# Patient Record
Sex: MALE | Race: WHITE | Employment: STUDENT | URBAN - METROPOLITAN AREA
[De-identification: names, ages, dates, MRNs, and addresses within clinical notes are randomized per-mention and may not be internally consistent; named-entity substitution may affect disease eponyms.]

---
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
## 2019-01-03 ENCOUNTER — OFFICE VISIT (OUTPATIENT)
Dept: PSYCHOLOGY | Facility: CLINIC | Age: 19
End: 2019-01-03
Payer: COMMERCIAL

## 2019-01-03 DIAGNOSIS — F90.2 ATTENTION DEFICIT HYPERACTIVITY DISORDER, COMBINED TYPE: ICD-10-CM

## 2019-01-03 DIAGNOSIS — Z63.4 BEREAVEMENT: ICD-10-CM

## 2019-01-03 DIAGNOSIS — F32.9 MAJOR DEPRESSIVE DISORDER: ICD-10-CM

## 2019-01-03 DIAGNOSIS — F41.1 GENERALIZED ANXIETY DISORDER: Primary | ICD-10-CM

## 2019-01-03 PROCEDURE — 90834 PSYTX W PT 45 MINUTES: CPT | Performed by: PSYCHOLOGIST

## 2019-01-03 SDOH — SOCIAL STABILITY - SOCIAL INSECURITY: DISSAPEARANCE AND DEATH OF FAMILY MEMBER: Z63.4

## 2019-01-03 NOTE — PROGRESS NOTES
Progress Note    Client Name: Bruce Lopez  Date: 1/3/2019         Service Type: Individual      Session Start Time: 13:00  Session End Time: 13:50     Session Length: 45-50     Session #: 1 (this episode of care)     Attendees: Client attended alone     Treatment Plan Last Reviewed: 9/6/16  PHQ-9 / IGNACIO-7 : N/A--age     DATA      Progress Since Last Session (Related to Symptoms / Goals / Homework):   Symptoms: Grief, anxiety    Homework: N/A      Episode of Care Goals: Assessing     Current / Ongoing Stressors and Concerns:   Death of grandfather and dog in past 3 weeks   Completed 1st semester of college, not returning next semester              Parents               Sister with genetic syndrome     Treatment Objective(s) Addressed in This Session:   Assess current needs and symptoms     Intervention:  This was my first meeting with Client since he left for college nearly 5 months ago. He provided an update on his first semester as well as other significant events since our last session. He noted that he will not be returning to college next semester, plans to stay home and work full-time, then transfer to [employer] for the summer or fall semester. He stated that he is at peace with this decision. Session focused on recent losses, including the death of his grandfather (he was present) and the euthanization of the family dog (he was not present--family did not want to disrupt his final days of the semester so did not tell him until he arrived home). Discussed the grief process and the emotions that are present for Client, including anger that he was not given the option to be present when the dog was put down. Talked about how Client may wish to find his own ritual or symbolism to honor his dog's life. He is considering a shadow box or a tattoo. Also spent some time discussing Client's relationship of ~7 months. He reported that it has been the  source of much growth and happiness. Discussed current symptoms (both prior to and after the recent deaths)--he noted that anxiety and anger were more prominent before the deaths, sadness/grief has been more present in the last few weeks. He was consistent in exercising while at school, with benefit noted.      ASSESSMENT: Current Emotional / Mental Status (status of significant symptoms):   Risk status (Self / Other harm or suicidal ideation)   Client denies current fears or concerns for personal safety.   Client denies current or recent suicidal ideation or behaviors.   Client denies current or recent homicidal ideation or behaviors.   Client denies current or recent self injurious behavior or ideation. He endorsed several instances of punching things when angry while at school; usually he directed this toward a punching bag, once he punched (and broke) a mirror. He noted that anger seems to have dissipated since he made the decision in late Oct. not to return to school next semester. He denied any impulse to punch or otherwise hurt another person.   Client denies other safety concerns.   A safety and risk management plan has been developed including: completed [facility] safety plan; verified that Client has contact numbers for [facility] after-hours service and [facility].      Appearance:   Casual    Eye Contact:   Good    Psychomotor Behavior: Normal    Attitude:   Cooperative , forthcoming   Orientation:   All   Speech    Rate / Production: Normal     Volume:  Normal    Mood:    Grieving, sad    Affect:    Appropriate    Thought Content:  Clear    Thought Form:  Coherent  Logical    Insight:    Fair      Medication Review:   Changes to psychiatric medications, see updated Medication List in EPIC. ; taking Zoloft, Wellbutrin, Adderall. He reports Seroquel (50 mg) was added as of today.      Medication Compliance:   Yes     Changes in Health Issues:   None reported     Chemical Use Review:   Substance Use: Chemical use reviewed;  "minimal alcohol use reported currently, using marijuana more regularly. Client is not interested in cessation at this time, does not identify his use as problematic. Identified signs that would suggest a problem (using more and more, interference with daily functioning, increasing mental health symptoms, can't afford).     Tobacco Use: Client reports he stopped smoking cigarettes on 1/1/19. He is still using an ecigarette, stated that he intends to stop  vaping in the coming weeks as well. Discussed factors that might build ambivalence around this habit.      Collateral Reports Completed:   Not Applicable              PLAN: (Client Tasks / Therapist Tasks / Other)  Support Client as he grieves significant losses. He will consider a personal ritual to commemorate his dog's life (possibly a shadow box or a tattoo). He will continue to reach out to supports and allow feelings to come and pass. Will continue to monitor substance use and support efforts to decrease/stop use if Client identifies this as a goal. He will continue with regular exercise. He continues with medication management through [facility]. Return appointments are scheduled. Plan to update treatment plan next session.       SAFETY PLAN:  Step 1: Warning signs / cues (Thoughts, images, mood, situation, behavior) that a crisis may be developing:  Thoughts: \"I'm a burden\"  Images: N/A  Thinking Processes: intrusive thoughts (bothersome, unwanted thoughts that come out of nowhere): automatic, unexpected thoughts of death and disorganized thinking: confusion, jumbled thoughts, can't articulate  Mood: worsening depression, mood swings and anxiety  Behaviors: isolating/withdrawing , not taking care of myself and not sleeping enough  Situations: relationship problems     Step 2: Coping strategies - Things I can do to take my mind off of my problems without contacting another person (relaxation technique, physical activity):  Distress Tolerance Strategies:  " "relaxation activities: go on roof, music, play with my pet , watch a funny movie: -, paced breathing/progressive muscle relaxation and intense exercise: core exercises for 2-3 minutes   Physical Activities: go for a walk, exercise: core exercise and deep breathing, stretching  Focus on helpful thoughts:  \"This is temporary\", \"It always passes\" and think about happy memories:     Step 3: People and social settings that provide distraction:   Name: Bob Phone: FB   Name: Gym Phone: [employer]   Name: Mom Phone: at home   park, gym  and [employer]     Step 4: Remind myself of people and things that are important to me and worth living for:  Family, friends, [employer]    Step 5: When I am in crisis, I can ask these people to help me use my safety plan:   Name: Bob Phone: FB   Name: School counselor Phone: at school   Name: Mom Phone: at home                  Name: Nina         Phone: [phone]; [phone] (After-hours number)    Step 6: Making the environment safe:   secure medications: completed by parents, remove things I could use to hurt myself: knife, pliers taken by parents and be around others    Step 7: Professionals or agencies I can contact during a crisis:  [facility] Daytime and After Hours Crisis Number: 326-109-0716, [phone]  Suicide Prevention Lifeline: 3-401-939-TALK (8049)  Text for Life: Text the word  LIFE  to 13779.  Local Crisis Services: 655.364.5541    Call 911 or go to my nearest emergency department.   I helped develop this safety plan and agree to use it when needed.  I have been given a copy of this plan.      Client signature _________________________________________________________________  Today s date:  10/6/2016  Adapted from Safety Plan Template 2008 Jessica Cheng and Brandon Monteiro is reprinted with the express permission of the authors.  No portion of the Safety Plan Template may be reproduced without the express, written permission.  You can contact the " authors at [email or web address] or [email or web address].      Nina Conner LP    Goal 1: Client will reduce anxiety symptoms.     I will know I've met my goal when I don't feel tense and worried about everyday things.      Objective #A (Client Action)    Client will identify 3 initial signs or symptoms of anxiety.  Status: Completed - Date: 9/6/16     Intervention(s)  Therapist will teach about components of anxiety.    Objective #B  Client will use at least 2 coping skills for anxiety management in the next 6 weeks.    Status: Continued - Date(s):9/6/16     Intervention(s)  Therapist will teach anxiety management strategies.    Objective #C  Client will use cognitive strategies identified in therapy to challenge anxious thoughts.  Status: Continued - Date(s):9/6/16     Intervention(s)  Therapist will teach CBT skills.    Goal 2: Client will reduce depressive symptoms.  Objective #A (Client Action)    Client will to identify at least 3 cognitive distortions contributing to depression.  Status: Continued - Date: 9/6/16     Intervention(s)  Therapist will teach CBT skills.    Objective #B  Client will identify at least one positive each day.   Status: Completed - Date: 9/6/16     Intervention(s)  Therapist will assign homework and provide accountability.    Goal 3: Client will improve body image.  Objective #A (Client Action)    Client will increase appreciation for his body and its capabilities.  Status: Completed- Date: 9/6/16     Intervention(s)  Therapist will use ACT, CBT skills to modify unhealthy beliefs about body image.

## 2019-01-17 ENCOUNTER — OFFICE VISIT (OUTPATIENT)
Dept: PSYCHOLOGY | Facility: CLINIC | Age: 19
End: 2019-01-17
Payer: COMMERCIAL

## 2019-01-17 DIAGNOSIS — F41.1 GENERALIZED ANXIETY DISORDER: ICD-10-CM

## 2019-01-17 DIAGNOSIS — Z63.4 BEREAVEMENT: ICD-10-CM

## 2019-01-17 DIAGNOSIS — F90.2 ATTENTION DEFICIT HYPERACTIVITY DISORDER, COMBINED TYPE: ICD-10-CM

## 2019-01-17 DIAGNOSIS — F32.9 MAJOR DEPRESSIVE DISORDER: Primary | ICD-10-CM

## 2019-01-17 PROCEDURE — 90834 PSYTX W PT 45 MINUTES: CPT | Performed by: PSYCHOLOGIST

## 2019-01-17 SDOH — SOCIAL STABILITY - SOCIAL INSECURITY: DISSAPEARANCE AND DEATH OF FAMILY MEMBER: Z63.4

## 2019-01-17 NOTE — PROGRESS NOTES
"                                             Progress Note    Client Name: Bruce Lopez  Date: 1/17/2019         Service Type: Individual      Session Start Time: 10:30  Session End Time: 11:20     Session Length: 45-50     Session #: 2 (this episode of care)     Attendees: Client attended alone     Treatment Plan Last Reviewed: 9/6/16  PHQ-9 / IGNACIO-7 : N/A--age     DATA      Progress Since Last Session (Related to Symptoms / Goals / Homework):   Symptoms: Grief, anxiety    Homework: N/A      Episode of Care Goals: Assessing     Current / Ongoing Stressors and Concerns:   Death of grandfather and dog in past 3 weeks   Completed 1st semester of college, not returning next semester              Parents               Sister with genetic syndrome     Treatment Objective(s) Addressed in This Session:   Assess current needs and symptoms     Intervention:  Continued work on processing grief and coping with recent losses. Client recognizes increased depressive symptoms, with multiple potential contributing factors, beyond the losses: medication changes, inconsistent compliance with medication, not exercising regularly, limited daily structure. Additionally, he stated that he is being scheduled for relatively few shifts at work, so he has even less activity than expected. He has already applied for other jobs in hopes of filling his time. Discussed the challenges of being in a \"limbo\" phase and how he can work on adding structure for himself. He readily identified that regular exercise and cooking/meal prep are the activities most likely to improve his mood. He stated that he would feel good about spending his time on these activities. Made a plan to get started today. He will also make efforts to take medication consistently.     ASSESSMENT: Current Emotional / Mental Status (status of significant symptoms):   Risk status (Self / Other harm or suicidal ideation)   Client denies current fears or concerns for " personal safety.   Client denies current or recent suicidal ideation or behaviors.   Client denies current or recent homicidal ideation or behaviors.   Client denies current or recent self injurious behavior or ideation. He endorsed several instances of punching things when angry while at school; usually he directed this toward a punching bag, once he punched (and broke) a mirror. He noted that anger seems to have dissipated since he made the decision in late Oct. not to return to school next semester. He denied any impulse to punch or otherwise hurt another person.   Client denies other safety concerns.   A safety and risk management plan has been developed including: completed [facility] safety plan; verified that Client has contact numbers for [facility] after-hours service and [facility].      Appearance:   Casual    Eye Contact:   Good    Psychomotor Behavior: Normal    Attitude:   Cooperative    Orientation:   All   Speech    Rate / Production: Normal     Volume:  Normal    Mood:    Grieving, sad    Affect:    Appropriate    Thought Content:  Clear    Thought Form:  Coherent  Logical    Insight:    Fair      Medication Review:   Changes to psychiatric medications, see updated Medication List in EPIC. ; taking Zoloft, Wellbutrin, Adderall. He reports he has discontinued Seroquel (did not like the feeling/effect).      Medication Compliance:   Variable--some forgetting     Changes in Health Issues:   None reported     Chemical Use Review:   Substance Use: Chemical use reviewed; minimal alcohol use reported currently, using marijuana more regularly. Client is not interested in cessation at this time, does not identify his use as problematic. Identified signs that would suggest a problem (using more and more, interference with daily functioning, increasing mental health symptoms, can't afford). He endorses previous experimentation with other substances (not currently).     Tobacco Use: Client reports he stopped smoking cigarettes on  "1/1/19. He is still using an ecigarette, stated that he intends to stop vaping in the coming weeks as well. Discussed factors that might build ambivalence around this habit.      Collateral Reports Completed:   Not Applicable              PLAN: (Client Tasks / Therapist Tasks / Other)  Continue to work on grieving significant losses. He is still planning a personal ritual to commemorate his dog's life (possibly a shadow box or a tattoo). He will continue to reach out to supports and allow feelings to come and pass. His goal is to increase daily structure, particularly as he awaits additional work hours. He will begin by writing down a workout schedule and completing meal prep/cooking. He will connect with a friend for support around exercise. Journaling has become an important outlet for him as well. Will continue to monitor substance use and support efforts to decrease/stop use if Client identifies this as a goal. He continues with medication management through [facility]. Return appointments are scheduled. Plan to update treatment plan next session.       SAFETY PLAN:  Step 1: Warning signs / cues (Thoughts, images, mood, situation, behavior) that a crisis may be developing:  Thoughts: \"I'm a burden\"  Images: N/A  Thinking Processes: intrusive thoughts (bothersome, unwanted thoughts that come out of nowhere): automatic, unexpected thoughts of death and disorganized thinking: confusion, jumbled thoughts, can't articulate  Mood: worsening depression, mood swings and anxiety  Behaviors: isolating/withdrawing , not taking care of myself and not sleeping enough  Situations: relationship problems     Step 2: Coping strategies - Things I can do to take my mind off of my problems without contacting another person (relaxation technique, physical activity):  Distress Tolerance Strategies:  relaxation activities: go on roof, music, play with my pet , watch a funny movie: -, paced breathing/progressive muscle relaxation and " "intense exercise: core exercises for 2-3 minutes   Physical Activities: go for a walk, exercise: core exercise and deep breathing, stretching  Focus on helpful thoughts:  \"This is temporary\", \"It always passes\" and think about happy memories:     Step 3: People and social settings that provide distraction:   Name: Bob Phone: FB   Name: Gym Phone: [employer]   Name: Mom Phone: at home   park, gym  and [employer]     Step 4: Remind myself of people and things that are important to me and worth living for:  Family, friends, [employer]    Step 5: When I am in crisis, I can ask these people to help me use my safety plan:   Name: Bob Phone: FB   Name: School counselor Phone: at school   Name: Mom Phone: at home                  Name: Nina         Phone: [phone]; [phone] (After-hours number)    Step 6: Making the environment safe:   secure medications: completed by parents, remove things I could use to hurt myself: knife, pliers taken by parents and be around others    Step 7: Professionals or agencies I can contact during a crisis:  [facility] Daytime and After Hours Crisis Number: 523-656-5444, [phone]  Suicide Prevention Lifeline: 1-000-584-HBBU (1407)  Text for Life: Text the word  LIFE  to 65087.  Local Crisis Services: 306.441.1082    Call 911 or go to my nearest emergency department.   I helped develop this safety plan and agree to use it when needed.  I have been given a copy of this plan.      Client signature _________________________________________________________________  Today s date:  10/6/2016  Adapted from Safety Plan Template 2008 Jessica Cheng and Brandon Monteiro is reprinted with the express permission of the authors.  No portion of the Safety Plan Template may be reproduced without the express, written permission.  You can contact the authors at [email or web address] or [email or web address].      Nina K. Ubaldo, LP    Goal 1: Client will reduce anxiety symptoms.     I " will know I've met my goal when I don't feel tense and worried about everyday things.      Objective #A (Client Action)    Client will identify 3 initial signs or symptoms of anxiety.  Status: Completed - Date: 9/6/16     Intervention(s)  Therapist will teach about components of anxiety.    Objective #B  Client will use at least 2 coping skills for anxiety management in the next 6 weeks.    Status: Continued - Date(s):9/6/16     Intervention(s)  Therapist will teach anxiety management strategies.    Objective #C  Client will use cognitive strategies identified in therapy to challenge anxious thoughts.  Status: Continued - Date(s):9/6/16     Intervention(s)  Therapist will teach CBT skills.    Goal 2: Client will reduce depressive symptoms.  Objective #A (Client Action)    Client will to identify at least 3 cognitive distortions contributing to depression.  Status: Continued - Date: 9/6/16     Intervention(s)  Therapist will teach CBT skills.    Objective #B  Client will identify at least one positive each day.   Status: Completed - Date: 9/6/16     Intervention(s)  Therapist will assign homework and provide accountability.    Goal 3: Client will improve body image.  Objective #A (Client Action)    Client will increase appreciation for his body and its capabilities.  Status: Completed- Date: 9/6/16     Intervention(s)  Therapist will use ACT, CBT skills to modify unhealthy beliefs about body image.

## 2019-01-31 ENCOUNTER — OFFICE VISIT (OUTPATIENT)
Dept: PSYCHOLOGY | Facility: CLINIC | Age: 19
End: 2019-01-31
Payer: COMMERCIAL

## 2019-01-31 DIAGNOSIS — F41.1 GENERALIZED ANXIETY DISORDER: ICD-10-CM

## 2019-01-31 DIAGNOSIS — F32.9 MAJOR DEPRESSIVE DISORDER: Primary | ICD-10-CM

## 2019-01-31 DIAGNOSIS — F90.2 ATTENTION DEFICIT HYPERACTIVITY DISORDER (ADHD), COMBINED TYPE: ICD-10-CM

## 2019-01-31 DIAGNOSIS — Z63.4 BEREAVEMENT: ICD-10-CM

## 2019-01-31 PROCEDURE — 90834 PSYTX W PT 45 MINUTES: CPT | Performed by: PSYCHOLOGIST

## 2019-01-31 SDOH — SOCIAL STABILITY - SOCIAL INSECURITY: DISSAPEARANCE AND DEATH OF FAMILY MEMBER: Z63.4

## 2019-01-31 NOTE — PROGRESS NOTES
"                                             Progress Note    Client Name: Bruce Lopez  Date: 1/31/2019         Service Type: Individual      Session Start Time: 10:35  Session End Time: 11:25     Session Length: 45-50     Session #: 3 (this episode of care)     Attendees: Client attended alone     Treatment Plan Last Reviewed: 9/6/16  PHQ-9 / IGNACIO-7 : N/A--age     DATA      Progress Since Last Session (Related to Symptoms / Goals / Homework):   Symptoms: Grief, anxiety    Homework: Limited progress      Episode of Care Goals: Assessing     Current / Ongoing Stressors and Concerns:   Death of grandfather and dog in past 3 weeks   Completed 1st semester of college, not returning next semester              Parents               Sister with genetic syndrome     Treatment Objective(s) Addressed in This Session:   Grieve losses  Assess current needs and symptoms     Intervention:  Client presented with another significant loss--breakup of the relationship with his girlfriend. He stated that this was a mutual decision and he does believe it is for the best, but it has been painful nonetheless. Validated this emotional experience--congruent with the situation, reflects the connection they had. Notably, Client has been reaching out to supports and working to take care of himself. He denies any thoughts of suicide or self-harm, and in fact has not had negative thoughts about himself. He acknowledged that he has not made progress with resuming exercise or focusing on healthy eating. He has been trying to work as much as possible and stay busy. Offered space for Client to talk about the relationship and his understanding of why it was no longer working. Discussed the growth he sees in himself and how this might inform future relationships. Encouraged him to continue to allow feelings to come and pass, not trying to circumvent or numb this aspect of the grieving. He identifies several \"safe\" people whom he can " lean on in the process, as needed. He stated that returning to regular exercise remains a goal, and he recognizes that it will be beneficial for his emotional wellbeing. Identified a first step--ask a friend to help him set up a workout schedule.     ASSESSMENT: Current Emotional / Mental Status (status of significant symptoms):   Risk status (Self / Other harm or suicidal ideation)   Client denies current fears or concerns for personal safety.   Client denies current or recent suicidal ideation or behaviors.   Client denies current or recent homicidal ideation or behaviors.   Client denies current or recent self injurious behavior or ideation. He endorsed several instances of punching things when angry while at school; usually he directed this toward a punching bag, once he punched (and broke) a mirror. He noted that anger seems to have dissipated since he made the decision in late Oct. not to return to school next semester. He denied any impulse to punch or otherwise hurt another person.   Client denies other safety concerns.   A safety and risk management plan has been developed including: completed [facility] safety plan; verified that Client has contact numbers for [facility] after-hours service and [facility].      Appearance:   Casual    Eye Contact:   Good    Psychomotor Behavior: Normal    Attitude:   Open, pensive    Orientation:   All   Speech    Rate / Production: Normal     Volume:  Normal    Mood:    Grieving, sad    Affect:    Appropriate    Thought Content:  Clear    Thought Form:  Coherent  Logical    Insight:    Fair      Medication Review:   Changes to psychiatric medications, see updated Medication List in EPIC. ; taking Zoloft, Wellbutrin, Adderall. He reports he has discontinued Seroquel (did not like the feeling/effect).      Medication Compliance:   Variable--some forgetting     Changes in Health Issues:   None reported     Chemical Use Review:   Substance Use: Chemical use reviewed; minimal alcohol use reported  "currently, using marijuana more regularly. Client is not interested in cessation at this time, does not identify his use as problematic. Identified signs that would suggest a problem (using more and more, interference with daily functioning, increasing mental health symptoms, can't afford). He endorses previous experimentation with other substances (not currently).     Tobacco Use: Client reports he stopped smoking cigarettes on 1/1/19. He is still using an ecigarette, stated that he intends to stop vaping in the coming weeks as well. Discussed factors that might build ambivalence around this habit.      Collateral Reports Completed:   Not Applicable              PLAN: (Client Tasks / Therapist Tasks / Other)  As Client grieves the breakup of a significant relationship (on top of other losses), focus on self-care and reaching out to support network. Encouraged him to allow feelings to come and pass, without trying to numb or avoid. He would like to increase exercise and will contact a friend to help him create a workout schedule. He continues to use journaling and other creative outlets with positive effect. Will continue to monitor substance use and support efforts to decrease/stop use if Client identifies this as a goal. He continues with medication management through [facility]. Return appointments are scheduled. Plan to update treatment plan next session.       SAFETY PLAN:  Step 1: Warning signs / cues (Thoughts, images, mood, situation, behavior) that a crisis may be developing:  Thoughts: \"I'm a burden\"  Images: N/A  Thinking Processes: intrusive thoughts (bothersome, unwanted thoughts that come out of nowhere): automatic, unexpected thoughts of death and disorganized thinking: confusion, jumbled thoughts, can't articulate  Mood: worsening depression, mood swings and anxiety  Behaviors: isolating/withdrawing , not taking care of myself and not sleeping enough  Situations: relationship problems     Step 2: " "Coping strategies - Things I can do to take my mind off of my problems without contacting another person (relaxation technique, physical activity):  Distress Tolerance Strategies:  relaxation activities: go on roof, music, play with my pet , watch a funny movie: -, paced breathing/progressive muscle relaxation and intense exercise: core exercises for 2-3 minutes   Physical Activities: go for a walk, exercise: core exercise and deep breathing, stretching  Focus on helpful thoughts:  \"This is temporary\", \"It always passes\" and think about happy memories:     Step 3: People and social settings that provide distraction:   Name: Bob Phone: FB   Name: Gym Phone: [employer]   Name: Mom Phone: at home   park, gym  and [employer]     Step 4: Remind myself of people and things that are important to me and worth living for:  Family, friends, [place]    Step 5: When I am in crisis, I can ask these people to help me use my safety plan:   Name: Bob Phone: FB   Name: School counselor Phone: at school   Name: Mom Phone: at home                  Name: Nina         Phone: [phone]; [phone] (After-hours number)    Step 6: Making the environment safe:   secure medications: completed by parents, remove things I could use to hurt myself: knife, pliers taken by parents and be around others    Step 7: Professionals or agencies I can contact during a crisis:  [facility] Daytime and After Hours Crisis Number: 677-923-7710, [phone]  Suicide Prevention Lifeline: 9-446-651-KVLO (2314)  Text for Life: Text the word  LIFE  to 66492.  Local Crisis Services: [phone]    Call 911 or go to my nearest emergency department.   I helped develop this safety plan and agree to use it when needed.  I have been given a copy of this plan.      Client signature _________________________________________________________________  Today s date:  10/6/2016  Adapted from Safety Plan Template 2008 Jessica PAUL" [place] is reprinted with the express permission of the authors.  No portion of the Safety Plan Template may be reproduced without the express, written permission.  You can contact the authors at [email or web address] or [email or web address].      Nina Conner LP    Goal 1: Client will reduce anxiety symptoms.     I will know I've met my goal when I don't feel tense and worried about everyday things.      Objective #A (Client Action)    Client will identify 3 initial signs or symptoms of anxiety.  Status: Completed - Date: 9/6/16     Intervention(s)  Therapist will teach about components of anxiety.    Objective #B  Client will use at least 2 coping skills for anxiety management in the next 6 weeks.    Status: Continued - Date(s):9/6/16     Intervention(s)  Therapist will teach anxiety management strategies.    Objective #C  Client will use cognitive strategies identified in therapy to challenge anxious thoughts.  Status: Continued - Date(s):9/6/16     Intervention(s)  Therapist will teach CBT skills.    Goal 2: Client will reduce depressive symptoms.  Objective #A (Client Action)    Client will to identify at least 3 cognitive distortions contributing to depression.  Status: Continued - Date: 9/6/16     Intervention(s)  Therapist will teach CBT skills.    Objective #B  Client will identify at least one positive each day.   Status: Completed - Date: 9/6/16     Intervention(s)  Therapist will assign homework and provide accountability.    Goal 3: Client will improve body image.  Objective #A (Client Action)    Client will increase appreciation for his body and its capabilities.  Status: Completed- Date: 9/6/16     Intervention(s)  Therapist will use ACT, CBT skills to modify unhealthy beliefs about body image.

## 2019-02-15 ENCOUNTER — OFFICE VISIT (OUTPATIENT)
Dept: PSYCHOLOGY | Facility: CLINIC | Age: 19
End: 2019-02-15
Payer: COMMERCIAL

## 2019-02-15 DIAGNOSIS — F32.9 MAJOR DEPRESSIVE DISORDER: Primary | ICD-10-CM

## 2019-02-15 DIAGNOSIS — F90.2 ATTENTION DEFICIT HYPERACTIVITY DISORDER, COMBINED TYPE: ICD-10-CM

## 2019-02-15 DIAGNOSIS — F41.1 GENERALIZED ANXIETY DISORDER: ICD-10-CM

## 2019-02-15 DIAGNOSIS — Z63.4 BEREAVEMENT: ICD-10-CM

## 2019-02-15 PROCEDURE — 90834 PSYTX W PT 45 MINUTES: CPT | Performed by: PSYCHOLOGIST

## 2019-02-15 SDOH — SOCIAL STABILITY - SOCIAL INSECURITY: DISSAPEARANCE AND DEATH OF FAMILY MEMBER: Z63.4

## 2019-02-15 NOTE — PROGRESS NOTES
"                                             Progress Note    Client Name: Bruce Lopez  Date: 2/15/2019         Service Type: Individual      Session Start Time: 13:10  Session End Time: 14:00     Session Length: 45-50     Session #: 4 (this episode of care)     Attendees: Client attended alone     Treatment Plan Last Reviewed: 9/6/16  PHQ-9 / IGNACIO-7 : N/A--age     DATA      Progress Since Last Session (Related to Symptoms / Goals / Homework):   Symptoms: Grief, anxiety    Homework: Some progress      Episode of Care Goals: Grieve significant losses     Current / Ongoing Stressors and Concerns:   Death of grandfather and dog in recent months   Breakup of romantic relationship   Completed 1st semester of college, not returning next semester              Parents               Sister with genetic syndrome     Treatment Objective(s) Addressed in This Session:   Grieve losses  Assess current needs and symptoms     Intervention:  Continued work on grieving loss of romantic relationship. Client reported that he is functioning appropriately, able to maintain perspective about reasons why breaking up was the right choice. He is surprised to note that his self-worth has remained generally intact--he still feels comfortable in his own skin, believes that he will find someone who accepts his true self. Talked about the progress that this represents and the work it has taken to reach this point. Client stated that he is pleased with his current network of friends and supports; he has several people to hang out with or reach out to if he's lonely. Agreed that this type of support can make all the difference during hard times. He is considering if he might be ready for another relationship relatively soon. Interestingly, he is feeling some pull to try to increase the connection with his dad, hoping that they can get to know each other more honestly \"as adults.\" Identified first steps Client could take in this " direction.      ASSESSMENT: Current Emotional / Mental Status (status of significant symptoms):   Risk status (Self / Other harm or suicidal ideation)   Client denies current fears or concerns for personal safety.   Client denies current or recent suicidal ideation or behaviors.   Client denies current or recent homicidal ideation or behaviors.   Client denies current or recent self injurious behavior or ideation. He endorsed several instances of punching things when angry while at school; usually he directed this toward a punching bag, once he punched (and broke) a mirror. He noted that anger seems to have dissipated since he made the decision in late Oct. not to return to school next semester. He denied any impulse to punch or otherwise hurt another person.   Client denies other safety concerns.   A safety and risk management plan has been developed including: completed [facility] safety plan; verified that Client has contact numbers for [facility] after-hours service and [facility].      Appearance:   Casual    Eye Contact:   Good    Psychomotor Behavior: Normal    Attitude:   Cooperative , thoughful   Orientation:   All   Speech    Rate / Production: Normal     Volume:  Normal    Mood:    Stable--intermittent flare ups of sadness/loneliness    Affect:    Appropriate    Thought Content:  Clear    Thought Form:  Coherent  Logical    Insight:    Fair      Medication Review:   No changes to current psychiatric medication(s); he reports taking Zoloft, Wellbutrin, Adderall.      Medication Compliance:   Variable--some forgetting     Changes in Health Issues:   None reported     Chemical Use Review:   Substance Use: Chemical use reviewed; minimal alcohol use reported currently, using marijuana more regularly. Client is not interested in cessation at this time, does not identify his use as problematic. Identified signs that would suggest a problem (using more and more, interference with daily functioning, increasing mental health symptoms,  "can't afford). He endorses previous experimentation with other substances (not currently).     Tobacco Use: Client reports he stopped smoking cigarettes on 1/1/19. He is still using an ecigarette, stated that he intends to stop vaping in the coming weeks as well. Discussed factors that might build ambivalence around this habit.      Collateral Reports Completed:   Not Applicable              PLAN: (Client Tasks / Therapist Tasks / Other)  Client will reach out to schedule coffee or a meal with his dad. He will begin by sharing some of the important life events he has experienced lately and then offer support to his dad in light of dad's current challenges. Client will continue with regular journaling to process issues from his previous relationship and determine if he is ready to seek a new relationship. Continue with focus on self-care and reaching out to support network. He is looking in to options for continuing his education next Fall. He continues with goal of resuming regular exercise. Will continue to monitor substance use and support efforts to decrease/stop use if Client identifies this as a goal. He continues with medication management through [facility]. Return appointments are scheduled. Plan to update treatment plan next session.       SAFETY PLAN:  Step 1: Warning signs / cues (Thoughts, images, mood, situation, behavior) that a crisis may be developing:  Thoughts: \"I'm a burden\"  Images: N/A  Thinking Processes: intrusive thoughts (bothersome, unwanted thoughts that come out of nowhere): automatic, unexpected thoughts of death and disorganized thinking: confusion, jumbled thoughts, can't articulate  Mood: worsening depression, mood swings and anxiety  Behaviors: isolating/withdrawing , not taking care of myself and not sleeping enough  Situations: relationship problems     Step 2: Coping strategies - Things I can do to take my mind off of my problems without contacting another person (relaxation " "technique, physical activity):  Distress Tolerance Strategies:  relaxation activities: go on roof, music, play with my pet , watch a funny movie: -, paced breathing/progressive muscle relaxation and intense exercise: core exercises for 2-3 minutes   Physical Activities: go for a walk, exercise: core exercise and deep breathing, stretching  Focus on helpful thoughts:  \"This is temporary\", \"It always passes\" and think about happy memories:     Step 3: People and social settings that provide distraction:   Name: Bob Phone: FB   Name: Gym Phone: [employer]   Name: Mom Phone: at home   park, gym  and [employer]     Step 4: Remind myself of people and things that are important to me and worth living for:  Family, friends, [place]    Step 5: When I am in crisis, I can ask these people to help me use my safety plan:   Name: Bob Phone: FB   Name: School counselor Phone: at school   Name: Mom Phone: at home                  Name: Nina         Phone: [phone]; [phone] (After-hours number)    Step 6: Making the environment safe:   secure medications: completed by parents, remove things I could use to hurt myself: knife, pliers taken by parents and be around others    Step 7: Professionals or agencies I can contact during a crisis:  [facility] Daytime and After Hours Crisis Number: 693-421-8790, [phone]  Suicide Prevention Lifeline: 2-637-671-TALK (3082)  Text for Life: Text the word  LIFE  to [address]381.  Local Crisis Services: 387.278.7940    Call 911 or go to my nearest emergency department.   I helped develop this safety plan and agree to use it when needed.  I have been given a copy of this plan.      Client signature _________________________________________________________________  Today s date:  10/6/2016  Adapted from Safety Plan Template 2008 Jessica Cheng and Brandon Monteiro is reprinted with the express permission of the authors.  No portion of the Safety Plan Template may be reproduced " without the express, written permission.  You can contact the authors at [email or web address] or [email or web address].      Nina Conner LP    Goal 1: Client will reduce anxiety symptoms.     I will know I've met my goal when I don't feel tense and worried about everyday things.      Objective #A (Client Action)    Client will identify 3 initial signs or symptoms of anxiety.  Status: Completed - Date: 9/6/16     Intervention(s)  Therapist will teach about components of anxiety.    Objective #B  Client will use at least 2 coping skills for anxiety management in the next 6 weeks.    Status: Continued - Date(s):9/6/16     Intervention(s)  Therapist will teach anxiety management strategies.    Objective #C  Client will use cognitive strategies identified in therapy to challenge anxious thoughts.  Status: Continued - Date(s):9/6/16     Intervention(s)  Therapist will teach CBT skills.    Goal 2: Client will reduce depressive symptoms.  Objective #A (Client Action)    Client will to identify at least 3 cognitive distortions contributing to depression.  Status: Continued - Date: 9/6/16     Intervention(s)  Therapist will teach CBT skills.    Objective #B  Client will identify at least one positive each day.   Status: Completed - Date: 9/6/16     Intervention(s)  Therapist will assign homework and provide accountability.    Goal 3: Client will improve body image.  Objective #A (Client Action)    Client will increase appreciation for his body and its capabilities.  Status: Completed- Date: 9/6/16     Intervention(s)  Therapist will use ACT, CBT skills to modify unhealthy beliefs about body image.

## 2019-02-27 ENCOUNTER — OFFICE VISIT (OUTPATIENT)
Dept: PSYCHOLOGY | Facility: CLINIC | Age: 19
End: 2019-02-27
Payer: COMMERCIAL

## 2019-02-27 DIAGNOSIS — F41.1 GENERALIZED ANXIETY DISORDER: ICD-10-CM

## 2019-02-27 DIAGNOSIS — Z63.4 BEREAVEMENT: ICD-10-CM

## 2019-02-27 DIAGNOSIS — F90.2 ATTENTION DEFICIT HYPERACTIVITY DISORDER, COMBINED TYPE: Primary | ICD-10-CM

## 2019-02-27 PROCEDURE — 90834 PSYTX W PT 45 MINUTES: CPT | Performed by: PSYCHOLOGIST

## 2019-02-27 SDOH — SOCIAL STABILITY - SOCIAL INSECURITY: DISSAPEARANCE AND DEATH OF FAMILY MEMBER: Z63.4

## 2019-02-27 NOTE — PROGRESS NOTES
"                                           Progress Note    Client Name: Bruce Lopez  Date: 2/27/19         Service Type: Individual  Video Visit: No     Session Start Time: 11:30  Session End Time: 12:20     Session Length: 45-50    Session #: 5 (this episode of care)    Attendees: Client attended alone     Treatment Plan Last Reviewed:  9/6/18  PHQ-9 / IGNACIO-7 : due next session    DATA  Interactive Complexity: No  Crisis: No       Progress Since Last Session (Related to Symptoms / Goals / Homework):   Symptoms: Stable    Homework: Some progress      Episode of Care Goals: Satisfactory progress - ACTION (Actively working towards change); Intervened by reinforcing change plan / affirming steps taken     Current / Ongoing Stressors and Concerns:                     Death of grandfather and dog in recent months              Breakup of romantic relationship              Completed 1st semester of college, not returning next semester              Parents               Sister with genetic syndrome     Treatment Objective(s) Addressed in This Session:   Grieve losses   Anxiety management     Intervention:   Client stated that he forgot to tell me during our last session that he had stopped antidepressant medication \"cold turkey\" about 1 month ago. He noted that he initially forgot to take the medication for several days (due to staying overnight at friends' houses, etc.), but he then made the decision not to restart. He would like to try staying off the medication and see how he feels. He endorsed no notable change in mood thus far. He acknowledged one instance of significant anger last night, including impulses to punch an object, but he was able to work through this adaptively, using breathing and music to allow the feelings to pass. Discussed signs to watch for that may indicate a shift in mood and the need to consider medication again. Client agreed to inform his mother of this medication change, so " that she can monitor for any concerning symptoms as well. 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 The structure of his schedule in general has been rather sporadic and unpredictable. Discussed potential impact of these factors on mood as well. Noted the importance of sleep, exercise, and nutrition for mood management, particularly if he would like to stay off medication. Client agreed that on days/nights when he is not working, he will make intentional efforts to go to sleep at night (usually by midnight) and stay awake during the day. Client was happy to share that the family has recently adopted a new dog.         ASSESSMENT: Current Emotional / Mental Status (status of significant symptoms):   Risk status (Self / Other harm or suicidal ideation)   Client denies current fears or concerns for personal safety.   Client denies current or recent suicidal ideation or behaviors.   Client denies current or recent homicidal ideation or behaviors.   Client denies current or recent self injurious behavior or ideation. He endorsed several instances of punching things when angry while at school; usually he directed this toward a punching bag, once he punched (and broke) a mirror. He noted that anger seems to have dissipated since he made the decision in late Oct. not to return to school next semester. He denied any impulse to punch or otherwise hurt another person.   Client denies other safety concerns.   Client Client reports there has been no change in risk factors since their last session.     Client Client reports there has been no change in protective factors since their last session.     A safety and risk management plan has been developed including: completed [facility] safety plan; verified that Client has contact numbers for [facility] after-hours service and [facility].     Appearance:   Casual, wearing hoodie with melendrez  up    Eye Contact:   Good    Psychomotor Behavior: Normal    Attitude:   Cooperative    Orientation:   All   Speech    Rate / Production: Normal     Volume:  Normal    Mood:    Generally stable; periods of increased anxiety    Affect:    Appropriate    Thought Content:  Clear    Thought Form:  Coherent  Logical    Insight:    Fair        Medication Review:   No changes to current psychiatric medication(s)     Medication Compliance:   No--Client stated that he has not taken antidepressant medication for ~1 month. He noted that he initially forgot for several days (staying overnight at friends' houses, etc.) but has since made the decision not to restart. He has continued with Adderall.     Changes in Health Issues:   None reported     Chemical Use Review:   Substance Use: No change-- minimal alcohol use reported currently, using marijuana more regularly. Client is not interested in cessation at this time, does not identify his use as problematic. Identified signs that would suggest a problem (using more and more, interference with daily functioning, increasing mental health symptoms, can't afford). He endorses previous experimentation with other substances (not currently).     Tobacco Use: Occasional cigarettes; uses ecigarette regularly.      Diagnosis:  Major Depressive Disorder  ADHD, combined type  Generalized Anxiety Disorder  Bereavement    Collateral Reports Completed:   Not Applicable    PLAN: (Client Tasks / Therapist Tasks / Other)  Closely monitor mood and functioning as Client proceeds without antidepressant medication. He will focus on regulating sleep schedule in the interest of supporting mood management. He will continue with regular journaling to process issues from his previous relationship and determine if he is ready to seek a new relationship. Continue with focus on self-care and reaching out to support network. He is looking in to options for continuing his education next Fall. Will continue to  monitor substance use and support efforts to decrease/stop use if Client identifies this as a goal. He continues with medication management through [facility]. Return appointments are scheduled. Plan to update treatment plan next session.         Nina Conner, LP                                                         ______________________________________________________________________    Treatment Plan    Client's Name: Bruce Lopez  YOB: 2000    Date: 9/6/16  DSM-V Diagnoses: Major Depressive Disorder; ADHD, combined type; Generalized Anxiety Disorder  Psychosocial / Contextual Factors:  parents , sister with genetic syndrome  WHODAS:     Referral / Collaboration:  Referral to another professional/service is not indicated at this time.    Anticipated number of session or this episode of care: re-evaluate every 90 days      MeasurableTreatment Goal(s) related to diagnosis / functional impairment(s)  Goal 1: Client will reduce anxiety symptoms.               I will know I've met my goal when I don't feel tense and worried about everyday things.       Objective #A (Client Action)                Client will identify 3 initial signs or symptoms of anxiety.  Status: Completed - Date: 9/6/16      Intervention(s)  Therapist will teach about components of anxiety.     Objective #B  Client will use at least 2 coping skills for anxiety management in the next 6 weeks.                          Status: Continued - Date(s):9/6/16      Intervention(s)  Therapist will teach anxiety management strategies.     Objective #C  Client will use cognitive strategies identified in therapy to challenge anxious thoughts.  Status: Continued - Date(s):9/6/16      Intervention(s)  Therapist will teach CBT skills.     Goal 2: Client will reduce depressive symptoms.  Objective #A (Client Action)                Client will identify at least 3 cognitive distortions contributing to depression.  Status: Continued  - Date: 9/6/16      Intervention(s)  Therapist will teach CBT skills.     Objective #B  Client will identify at least one positive each day.   Status: Completed - Date: 9/6/16      Intervention(s)  Therapist will assign homework and provide accountability.     Goal 3: Client will improve body image.  Objective #A (Client Action)                Client will increase appreciation for his body and its capabilities.  Status: Completed- Date: 9/6/16      Intervention(s)  Therapist will use ACT, CBT skills to modify unhealthy beliefs about body image.    Client has reviewed and agreed to the above plan.      Nina Conner, PAL  February 27, 2019

## 2019-03-15 ENCOUNTER — OFFICE VISIT (OUTPATIENT)
Dept: PSYCHOLOGY | Facility: CLINIC | Age: 19
End: 2019-03-15
Payer: COMMERCIAL

## 2019-03-15 DIAGNOSIS — F90.2 ATTENTION DEFICIT HYPERACTIVITY DISORDER, COMBINED TYPE: Primary | ICD-10-CM

## 2019-03-15 DIAGNOSIS — F41.1 GENERALIZED ANXIETY DISORDER: ICD-10-CM

## 2019-03-15 PROCEDURE — 90834 PSYTX W PT 45 MINUTES: CPT | Performed by: PSYCHOLOGIST

## 2019-03-15 NOTE — PROGRESS NOTES
Progress Note    Client Name: Bruce Lopez  Date: 3/15/19         Service Type: Individual  Video Visit: No     Session Start Time: 11:30  Session End Time: 12:20     Session Length: 45-50    Session #: 6 (this episode of care)    Attendees: Client attended alone     Treatment Plan Last Reviewed:  9/6/18  PHQ-9 / IGNACIO-7 : due next session    DATA  Interactive Complexity: No  Crisis: No       Progress Since Last Session (Related to Symptoms / Goals / Homework):   Symptoms: Stable    Homework: Some progress      Episode of Care Goals: Satisfactory progress - ACTION (Actively working towards change); Intervened by reinforcing change plan / affirming steps taken     Current / Ongoing Stressors and Concerns:                     Death of grandfather and dog in recent months              Breakup of romantic relationship              Completed 1st semester of college, not returning next semester              Parents               Sister with genetic syndrome     Treatment Objective(s) Addressed in This Session:   Grieve losses   Anxiety management     Intervention:   Client reported continued stable mood, now more than 6 weeks since discontinuing antidepressant medication. 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 He noted that his work has also unfortunately resulted in an unpredictable, upside-down schedule, where he often works into the early morning and then sleeps much of the day. He plan to reset his body clock to more normal hours again this weekend. Session today focused on anger, and how Client's experience and expression of anger have changed over the past year. He described increased tolerance for negative feelings, allowing himself to simply feel the emotions without trying to avoid or distract. He does continue to have moments of feeling that his anger could  become overwhelming (which he believes is typical of adolescent males), but he is gaining confidence about his control with each instance of allowing the feelings to come and pass. He stated that working a punching bag has been a helpful outlet in the past, and he may look into options for this currently.         ASSESSMENT: Current Emotional / Mental Status (status of significant symptoms):   Risk status (Self / Other harm or suicidal ideation)   Client denies current fears or concerns for personal safety.   Client denies current or recent suicidal ideation or behaviors.   Client denies current or recent homicidal ideation or behaviors.   Client denies current or recent self injurious behavior or ideation. He endorsed several instances of punching things when angry while at school; usually he directed this toward a punching bag, once he punched (and broke) a mirror. He noted that anger seems to have dissipated since he made the decision in late Oct. not to return to school next semester. He denied any impulse to punch or otherwise hurt another person.   Client denies other safety concerns.   Client Client reports there has been no change in risk factors since their last session.     Client Client reports there has been no change in protective factors since their last session.     A safety and risk management plan has been developed including: completed [facility] safety plan; verified that Client has contact numbers for [facility] after-hours service and [facility].     Appearance:   Casual, wearing hoodie with melendrez up    Eye Contact:   Good    Psychomotor Behavior: Normal    Attitude:   Engaged    Orientation:   All   Speech    Rate / Production: Normal     Volume:  Normal    Mood:    More positive overall; brief periods of anger, sadness; intermittent anxiety    Affect:    Appropriate    Thought Content:  Clear    Thought Form:  Coherent  Logical    Insight:    Fair        Medication Review:   No changes to current psychiatric  medication(s)     Medication Compliance:   No--Client stated that he has not taken antidepressant medication for 6 weeks+. He noted that he initially forgot for several days (staying overnight at friends' houses, etc.) but has since made the decision not to restart. He has continued with Adderall.     Changes in Health Issues:   None reported     Chemical Use Review:   Substance Use: No change-- minimal alcohol use reported currently, using marijuana more regularly. Client is not interested in cessation at this time, does not identify his use as problematic. Identified signs that would suggest a problem (using more and more, interference with daily functioning, increasing mental health symptoms, can't afford). He endorses previous experimentation with other substances (not currently).     Tobacco Use: Occasional cigarettes; uses ecigarette regularly.      Diagnosis:  Major Depressive Disorder  ADHD, combined type  Generalized Anxiety Disorder  Bereavement    Collateral Reports Completed:   Not Applicable    PLAN: (Client Tasks / Therapist Tasks / Other)  Client will continue to work on distress tolerance, allowing himself to feel negative emotions as they arise and then pass. He will look into options for using a punching bag (an outlet that has been helpful in the past). He plans to reset or regulate his sleep schedule, now that seasonal work is slowing down. He will continue with regular journaling. He is looking in to options for continuing his education next Fall. Will continue to monitor substance use and support efforts to decrease/stop use if Client identifies this as a goal. He continues with medication management through [facility] [place]. Return appointments are scheduled. Plan to update treatment plan next session.         Nina Conner, PAL                                                         ______________________________________________________________________    Treatment Plan    Client's Name: Bruce  Dwight Lopez  YOB: 2000    Date: 9/6/16  DSM-V Diagnoses: Major Depressive Disorder; ADHD, combined type; Generalized Anxiety Disorder  Psychosocial / Contextual Factors:  parents , sister with genetic syndrome  WHODAS:     Referral / Collaboration:  Referral to another professional/service is not indicated at this time.    Anticipated number of session or this episode of care: re-evaluate every 90 days      MeasurableTreatment Goal(s) related to diagnosis / functional impairment(s)  Goal 1: Client will reduce anxiety symptoms.               I will know I've met my goal when I don't feel tense and worried about everyday things.       Objective #A (Client Action)                Client will identify 3 initial signs or symptoms of anxiety.  Status: Completed - Date: 9/6/16      Intervention(s)  Therapist will teach about components of anxiety.     Objective #B  Client will use at least 2 coping skills for anxiety management in the next 6 weeks.                          Status: Continued - Date(s):9/6/16      Intervention(s)  Therapist will teach anxiety management strategies.     Objective #C  Client will use cognitive strategies identified in therapy to challenge anxious thoughts.  Status: Continued - Date(s):9/6/16      Intervention(s)  Therapist will teach CBT skills.     Goal 2: Client will reduce depressive symptoms.  Objective #A (Client Action)                Client will identify at least 3 cognitive distortions contributing to depression.  Status: Continued - Date: 9/6/16      Intervention(s)  Therapist will teach CBT skills.     Objective #B  Client will identify at least one positive each day.   Status: Completed - Date: 9/6/16      Intervention(s)  Therapist will assign homework and provide accountability.     Goal 3: Client will improve body image.  Objective #A (Client Action)                Client will increase appreciation for his body and its capabilities.  Status: Completed-  Date: 9/6/16      Intervention(s)  Therapist will use ACT, CBT skills to modify unhealthy beliefs about body image.    Client has reviewed and agreed to the above plan.      Nina Conner LP  February 27, 2019

## 2019-03-29 ENCOUNTER — OFFICE VISIT (OUTPATIENT)
Dept: PSYCHOLOGY | Facility: CLINIC | Age: 19
End: 2019-03-29
Payer: COMMERCIAL

## 2019-03-29 DIAGNOSIS — F41.1 GENERALIZED ANXIETY DISORDER: Primary | ICD-10-CM

## 2019-03-29 DIAGNOSIS — F90.2 ATTENTION DEFICIT HYPERACTIVITY DISORDER, COMBINED TYPE: ICD-10-CM

## 2019-03-29 PROCEDURE — 90834 PSYTX W PT 45 MINUTES: CPT | Performed by: PSYCHOLOGIST

## 2019-04-12 ENCOUNTER — OFFICE VISIT (OUTPATIENT)
Dept: PSYCHOLOGY | Facility: CLINIC | Age: 19
End: 2019-04-12
Payer: COMMERCIAL

## 2019-04-12 DIAGNOSIS — F90.2 ATTENTION DEFICIT HYPERACTIVITY DISORDER, COMBINED TYPE: ICD-10-CM

## 2019-04-12 DIAGNOSIS — F41.1 GENERALIZED ANXIETY DISORDER: Primary | ICD-10-CM

## 2019-04-12 DIAGNOSIS — F33.0 MAJOR DEPRESSIVE DISORDER, RECURRENT EPISODE, MILD (H): ICD-10-CM

## 2019-04-12 PROCEDURE — 90834 PSYTX W PT 45 MINUTES: CPT | Performed by: PSYCHOLOGIST

## 2019-04-12 NOTE — PROGRESS NOTES
Progress Note    Client Name: Bruce Lopez  Date: 4/12/19         Service Type: Individual  Video Visit: No     Session Start Time: 11:30  Session End Time: 12:20     Session Length: 45-50    Session #: 8 (this episode of care)    Attendees: Client attended alone     Treatment Plan Last Reviewed:  9/6/18  PHQ-9 / IGNACIO-7 : due next session  CGI: 4/12/19    DATA  Interactive Complexity: No  Crisis: No       Progress Since Last Session (Related to Symptoms / Goals / Homework):   Symptoms: Stable    Homework: Good progress      Episode of Care Goals: Satisfactory progress - ACTION (Actively working towards change); Intervened by reinforcing change plan / affirming steps taken     Current / Ongoing Stressors and Concerns:   Death of grandfather and dog in recent months              Breakup of romantic relationship              Completed 1st semester of college, not returning next semester              Parents               Sister with genetic syndrome     Treatment Objective(s) Addressed in This Session:   Grieve losses   Anxiety management     Intervention:   Discussed Client's first week at his new job. He reports minimal symptoms of anxiety, even as he made mistakes or dealt with setbacks. He is having no difficulty getting along with coworkers and is consciously cultivating a positive attitude each day. He stated that he is enjoying the physical nature of the job as well as learning new skills. Offered reinforcement for his good efforts in making this significant transition. Client described overall stable mood--no significant episodes of sadness or anger. He noted that he has been feeling more lonely (his family is out of town for the week) and thinking more about starting a new romantic relationship (or even seeking more friendships). He believes there is potential for some new friendships at work, also is looking forward to starting trade school in the  Fall for further opportunities to meet new people. Discussed reducing frequency of our sessions, given Client's sustained stable functioning.          ASSESSMENT: Current Emotional / Mental Status (status of significant symptoms):   Risk status (Self / Other harm or suicidal ideation)   Client denies current fears or concerns for personal safety.   Client denies current or recent suicidal ideation or behaviors.   Client denies current or recent homicidal ideation or behaviors.   Client denies current or recent self injurious behavior or ideation. He endorsed several instances of punching things when angry while at school; usually he directed this toward a punching bag, once he punched (and broke) a mirror. He noted that anger seems to have dissipated since he made the decision in late Oct. not to return to school next semester. He denied any impulse to punch or otherwise hurt another person.   Client denies other safety concerns.   Client Client reports there has been no change in risk factors since their last session.     Client Client reports there has been no change in protective factors since their last session.     A safety and risk management plan has been developed including: completed [facility] safety plan; verified that Client has contact numbers for [facility] after-hours service and [facility].     Appearance:   Casual, wearing nose ring    Eye Contact:   Good    Psychomotor Behavior: Normal    Attitude:   Cooperative , engaged   Orientation:   All   Speech    Rate / Production: Normal     Volume:  Normal    Mood:    Stable; mood and anxiety well-controlled currently    Affect:    Appropriate    Thought Content:  Clear    Thought Form:  Coherent  Logical    Insight:    Fair        Medication Review:   No changes to current psychiatric medication(s)     Medication Compliance:   No--Client stated that he has not taken antidepressant medication for 3+ months. He noted that he initially forgot for several days (staying  overnight at friends' houses, etc.) but has since made the decision not to restart. He has continued with Adderall.     Changes in Health Issues:   None reported     Chemical Use Review:   Substance Use: No change-- minimal alcohol use reported currently, using marijuana more regularly. Client is not interested in cessation at this time, does not identify his use as problematic. Identified signs that would suggest a problem (using more and more, interference with daily functioning, increasing mental health symptoms, can't afford). He endorses previous experimentation with other substances (not currently).     Tobacco Use: Occasional cigarettes; uses ecigarette regularly.      Diagnosis:  Major Depressive Disorder  ADHD, combined type  Generalized Anxiety Disorder    Collateral Reports Completed:   CGI     PLAN: (Client Tasks / Therapist Tasks / Other)  Given Client's stable functioning, even in the face of stressors such as starting a new job, agreed we will reduce frequency of our sessions. Return appointment scheduled for next month. Client will call me sooner with any concerning changes in symptoms/functioning or as needed. He will continue with positive attitude at work, maintaining openness to possibilities for interactions and new friendships as they arise. His goal is to maintain a relatively consistent sleep schedule, even on the weekends, to assist with adjustment to his new work schedule. Will continue to monitor substance use and support efforts to decrease/stop use if Client identifies this as a goal. He continues with medication management through [facility]. Plan to update treatment plan next session.         Nina Conner LP                                                         ______________________________________________________________________    Treatment Plan    Client's Name: Bruce Lopez  YOB: 2000    Date: 9/6/16  DSM-V Diagnoses: Major Depressive Disorder; ADHD,  combined type; Generalized Anxiety Disorder  Psychosocial / Contextual Factors:  parents , sister with genetic syndrome  WHODAS:     Referral / Collaboration:  Referral to another professional/service is not indicated at this time.    Anticipated number of session or this episode of care: re-evaluate every 90 days      MeasurableTreatment Goal(s) related to diagnosis / functional impairment(s)  Goal 1: Client will reduce anxiety symptoms.               I will know I've met my goal when I don't feel tense and worried about everyday things.       Objective #A (Client Action)                Client will identify 3 initial signs or symptoms of anxiety.  Status: Completed - Date: 9/6/16      Intervention(s)  Therapist will teach about components of anxiety.     Objective #B  Client will use at least 2 coping skills for anxiety management in the next 6 weeks.                          Status: Continued - Date(s):9/6/16      Intervention(s)  Therapist will teach anxiety management strategies.     Objective #C  Client will use cognitive strategies identified in therapy to challenge anxious thoughts.  Status: Continued - Date(s):9/6/16      Intervention(s)  Therapist will teach CBT skills.     Goal 2: Client will reduce depressive symptoms.  Objective #A (Client Action)                Client will identify at least 3 cognitive distortions contributing to depression.  Status: Continued - Date: 9/6/16      Intervention(s)  Therapist will teach CBT skills.     Objective #B  Client will identify at least one positive each day.   Status: Completed - Date: 9/6/16      Intervention(s)  Therapist will assign homework and provide accountability.     Goal 3: Client will improve body image.  Objective #A (Client Action)                Client will increase appreciation for his body and its capabilities.  Status: Completed- Date: 9/6/16      Intervention(s)  Therapist will use ACT, CBT skills to modify unhealthy beliefs about body  image.    Client has reviewed and agreed to the above plan.      Nina Conner, LP  February 27, 2019

## 2019-05-17 ENCOUNTER — OFFICE VISIT (OUTPATIENT)
Dept: PSYCHOLOGY | Facility: CLINIC | Age: 19
End: 2019-05-17
Payer: COMMERCIAL

## 2019-05-17 DIAGNOSIS — F90.2 ATTENTION DEFICIT HYPERACTIVITY DISORDER, COMBINED TYPE: ICD-10-CM

## 2019-05-17 DIAGNOSIS — F32.9 MAJOR DEPRESSIVE DISORDER: Primary | ICD-10-CM

## 2019-05-17 DIAGNOSIS — F41.1 GENERALIZED ANXIETY DISORDER: ICD-10-CM

## 2019-05-17 PROCEDURE — 90834 PSYTX W PT 45 MINUTES: CPT | Performed by: PSYCHOLOGIST

## 2019-05-17 NOTE — PROGRESS NOTES
Progress Note    Client Name: Bruce Lopez  Date: 5/17/19         Service Type: Individual  Video Visit: No     Session Start Time: 14:00  Session End Time: 14:50     Session Length: 45-50    Session #: 9 (this episode of care)    Attendees: Client attended alone     Treatment Plan Last Reviewed:  9/6/18  PHQ-9 / IGNACIO-7 : due next session  CGI: 4/12/19    DATA  Interactive Complexity: No  Crisis: No       Progress Since Last Session (Related to Symptoms / Goals / Homework):   Symptoms: Stable    Homework: Good progress      Episode of Care Goals: Satisfactory progress - ACTION (Actively working towards change); Intervened by reinforcing change plan / affirming steps taken     Current / Ongoing Stressors and Concerns:   Death of grandfather and dog in recent months              Breakup of romantic relationship              Completed 1st semester of college, not returning next semester              Parents               Sister with genetic syndrome     Treatment Objective(s) Addressed in This Session:   Grieve losses   Anxiety management     Intervention:   Client described generally stable mood and functioning over the past month since our last session. He has been working full-time (often 10-12 hour days) and sleeping on a consistent schedule (asleep at night, awake during the day). Overall, the work is going well--Client stated that he is catching on as expected, his coworkers are easy to get along with, he is earning good money. He noted that the work is mentally and physically exhausting, so he has had energy for little else besides working. He acknowledges that he is not eating well (low motivation/energy to plan meals or prepare food), and he is not seeing friends. He said that he has had brief moments of feeling overwhelmed, on the verge of tears, both at work and outside of work, but he has been able to get through these and move on. He reported 2  "instances in which thoughts of self-harm popped into his head. He characterized these thoughts as \"unwanted\" or intrusive, and he did not engage the thoughts. Talked about seeing these thoughts as a signal and translating the information being conveyed (e.g., I'm overwhelmed, I need a break). We also discussed how Client can work on taking care of himself, even in the midst of an all-consuming work situation (which will be temporary).         ASSESSMENT: Current Emotional / Mental Status (status of significant symptoms):   Risk status (Self / Other harm or suicidal ideation)   Client denies current fears or concerns for personal safety.   Client denies current or recent suicidal ideation or behaviors.   Client denies current or recent homicidal ideation or behaviors.   Client denies current or recent self injurious behavior or ideation. He endorsed 2 instances of unwanted/intrusive thoughts of self-harm that he was able to dismiss.   Client denies other safety concerns.   Client reports there has been no change in risk factors since his last session.     Client reports there has been a change in protective factors since his last session. Sleep schedule has been regulated.    A safety and risk management plan has been developed including: completed [facility] safety plan; verified that Client has contact numbers for [facility] after-hours service and [facility].     Appearance:   Casual, wearing nose ring    Eye Contact:   Good    Psychomotor Behavior: Normal    Attitude:   Cooperative , thoughtful   Orientation:   All   Speech    Rate / Production: Normal     Volume:  Normal    Mood:    Stable; coping with mood and anxiety symptoms as they arise    Affect:    Appropriate    Thought Content:  Clear    Thought Form:  Coherent  Logical    Insight:    Fair        Medication Review:   Changes to psychiatric medications, see updated Medication List in EPIC.  Client taking Adderall only.     Medication Compliance:   Yes, with " Adderall     Changes in Health Issues:   None reported     Chemical Use Review:   Substance Use: No change-- minimal alcohol use reported currently, using marijuana more regularly. Client is not interested in cessation at this time, does not identify his use as problematic. Identified signs that would suggest a problem (using more and more, interference with daily functioning, increasing mental health symptoms, can't afford). He endorses previous experimentation with other substances (not currently).     Tobacco Use: Occasional cigarettes; uses ecigarette regularly.      Diagnosis:  Major Depressive Disorder  ADHD, combined type  Generalized Anxiety Disorder    Collateral Reports Completed:   Not Applicable     PLAN: (Client Tasks / Therapist Tasks / Other)  Client will consider making use of pre-prepared foods to give him additional options for meals that require minimum effort. Protein shakes are another possibility. He will continue to take brief breaks away from work when he feels overwhelmed, then will use deep breathing to refocus and reset. He will intentionally engage with friends on the weekend to balance out his current all-work schedule.  Return appointment scheduled for next month. Client will call me sooner with any concerning changes in symptoms/functioning or as needed. Will continue to monitor substance use and support efforts to decrease/stop use if Client identifies this as a goal. He continues with medication management through [facility]. Plan to update treatment plan next session.         Nina Conner LP                                                         ______________________________________________________________________    Treatment Plan    Client's Name: Bruce Lopez  YOB: 2000    Date: 9/6/16  DSM-V Diagnoses: Major Depressive Disorder; ADHD, combined type; Generalized Anxiety Disorder  Psychosocial / Contextual Factors:  parents , sister with  genetic syndrome  WHODAS:     Referral / Collaboration:  Referral to another professional/service is not indicated at this time.    Anticipated number of session or this episode of care: re-evaluate every 90 days      MeasurableTreatment Goal(s) related to diagnosis / functional impairment(s)  Goal 1: Client will reduce anxiety symptoms.               I will know I've met my goal when I don't feel tense and worried about everyday things.       Objective #A (Client Action)                Client will identify 3 initial signs or symptoms of anxiety.  Status: Completed - Date: 9/6/16      Intervention(s)  Therapist will teach about components of anxiety.     Objective #B  Client will use at least 2 coping skills for anxiety management in the next 6 weeks.                          Status: Continued - Date(s):9/6/16      Intervention(s)  Therapist will teach anxiety management strategies.     Objective #C  Client will use cognitive strategies identified in therapy to challenge anxious thoughts.  Status: Continued - Date(s):9/6/16      Intervention(s)  Therapist will teach CBT skills.     Goal 2: Client will reduce depressive symptoms.  Objective #A (Client Action)                Client will identify at least 3 cognitive distortions contributing to depression.  Status: Continued - Date: 9/6/16      Intervention(s)  Therapist will teach CBT skills.     Objective #B  Client will identify at least one positive each day.   Status: Completed - Date: 9/6/16      Intervention(s)  Therapist will assign homework and provide accountability.     Goal 3: Client will improve body image.  Objective #A (Client Action)                Client will increase appreciation for his body and its capabilities.  Status: Completed- Date: 9/6/16      Intervention(s)  Therapist will use ACT, CBT skills to modify unhealthy beliefs about body image.    Client has reviewed and agreed to the above plan.      Nina Conner, PAL  February 27, 2019

## 2019-11-14 ENCOUNTER — OFFICE VISIT (OUTPATIENT)
Dept: PSYCHOLOGY | Facility: CLINIC | Age: 19
End: 2019-11-14
Payer: COMMERCIAL

## 2019-11-14 DIAGNOSIS — F41.1 GENERALIZED ANXIETY DISORDER: ICD-10-CM

## 2019-11-14 DIAGNOSIS — F32.9 MDD (MAJOR DEPRESSIVE DISORDER): Primary | ICD-10-CM

## 2019-11-14 DIAGNOSIS — F90.2 ATTENTION DEFICIT HYPERACTIVITY DISORDER, COMBINED TYPE: ICD-10-CM

## 2019-11-14 PROCEDURE — 90834 PSYTX W PT 45 MINUTES: CPT | Performed by: PSYCHOLOGIST

## 2019-11-14 NOTE — PROGRESS NOTES
"                                           Progress Note    Client Name: Bruce Lopez  Date: 11/14/19         Service Type: Individual  Video Visit: No     Session Start Time: 13:05  Session End Time: 13:50     Session Length: 45-50    Session #: 1 (this episode of care)    Attendees: Client attended alone     Treatment Plan Last Reviewed:  9/6/18  PHQ-9 / IGNACIO-7 : due next session  CGI: 4/12/19    DATA  Interactive Complexity: No  Crisis: No       Progress Since Last Session (Related to Symptoms / Goals / Homework):   Symptoms: Variable--depression, anger, anxiety    Homework: Good progress      Episode of Care Goals: Satisfactory progress - ACTION (Actively working towards change); Intervened by reinforcing change plan / affirming steps taken     Current / Ongoing Stressors and Concerns:   New school program, living independently   Death of grandfather and dog ~1 year ago              Parents               Sister with genetic syndrome     Treatment Objective(s) Addressed in This Session:    Assess current needs and symptoms   [Grieve losses   Anxiety management]     Intervention:   This was my first meeting with Client in ~6 months. In the intervening time, he has moved to [place] (living in an apartment with a roommate) and started a [employer] program ([employer]). He endorsed variable mood and some difficulty adjusting to living independently. He reported an initial 4-week period of feeling numb, disconnected, and depressed. Mood has been somewhat better since then, but he continues to struggle with dysregulation broadly. He stated that his attendance at class has been inconsistent. He is using marijuana regularly, alcohol less often. He reports feeling connected socially. He has recently started trying to keep to a more regular schedule. He also took the step of initiating PT for back pain. He reported that he is thinking a lot about \"how to be a man\" and is particularly struggling with his own " father's absence during his childhood. He shared some of his thoughts about how this has influenced him. Discussed the necessary developmental tasks of late adolescence--related to separation from parents, identity development, deciding who he wants to be and what is important to him. Talked about the possibility of working with a male therapist on some of these issues, and Client was open to this suggestion. Also explored potential therapeutic resources available to him in [place], which would allow him to have more regular follow up.          ASSESSMENT: Current Emotional / Mental Status (status of significant symptoms):   Risk status (Self / Other harm or suicidal ideation)   Client denies current fears or concerns for personal safety.   Client denies current or recent suicidal ideation or behaviors.   Client denies current or recent homicidal ideation or behaviors.   Client denies current or recent self injurious behavior or ideation.    Client denies other safety concerns.   Client reports there has been no change in risk factors since his last session.     Client reports there has been no change in protective factors since his last session.   A safety and risk management plan has been developed including: Patient consented to co-developed safety plan.  Safety and risk management plan was completed.  Patient agreed to use safety plan should any safety concerns arise.  A copy was given to the patient..     Appearance:   Casual, wearing nose ring , knit cap   Eye Contact:   Good    Psychomotor Behavior: Normal    Attitude:   Engaged, open    Orientation:   All   Speech    Rate / Production: Normal     Volume:  Normal    Mood:    Variable; periods of increased depression and anxiety    Affect:    Appropriate    Thought Content:  Clear    Thought Form:  Coherent  Logical    Insight:    Fair        Medication Review:   No changes to current psychiatric medication(s); Client taking Adderall only.     Medication  Compliance:   Yes     Changes in Health Issues:   None reported     Chemical Use Review:   Substance Use: No change-- minimal alcohol use reported currently, using marijuana more regularly. Client is not interested in cessation at this time, does not identify his use as problematic. Identified signs that would suggest a problem (using more and more, interference with daily functioning, increasing mental health symptoms, can't afford). He endorses previous experimentation with other substances (not currently).     Tobacco Use: Cigarettes, ecigarette       Diagnosis:  Major Depressive Disorder  ADHD, combined type  Generalized Anxiety Disorder    Collateral Reports Completed:   Not Applicable     PLAN: (Client Tasks / Therapist Tasks / Other)  As Client works toward increased regulation, he will make use of the 10-minute rule for moving past inertia with self-care tasks (e.g., showering, cooking, exercising). He will consider the possibility of working with a male therapist to discuss issues related to becoming a mature man and working through the lingering effects of missing his father while growing up. May also consider a therapist in the [place] area to allow for more regular visits. Return appointment scheduled for next month. Will revisit the disposition for future services at that time. He will continue with PT for back pain. Client will call me sooner with any concerning changes in symptoms/functioning or as needed. Will continue to monitor substance use and support efforts to decrease/stop use if Client identifies this as a goal. He continues with medication management through [facility].      Nina Conner LP                                                      ______________________________________________________________________    Treatment Plan    Client's Name: Bruce Lopez  YOB: 2000    Date: 9/6/16  DSM-V Diagnoses: Major Depressive Disorder; ADHD, combined type;  Generalized Anxiety Disorder  Psychosocial / Contextual Factors:  parents , sister with genetic syndrome  WHODAS:     Referral / Collaboration:  Referral to another professional/service is not indicated at this time.    Anticipated number of session or this episode of care: re-evaluate every 90 days      MeasurableTreatment Goal(s) related to diagnosis / functional impairment(s)  Goal 1: Client will reduce anxiety symptoms.               I will know I've met my goal when I don't feel tense and worried about everyday things.       Objective #A (Client Action)                Client will identify 3 initial signs or symptoms of anxiety.  Status: Completed - Date: 9/6/16      Intervention(s)  Therapist will teach about components of anxiety.     Objective #B  Client will use at least 2 coping skills for anxiety management in the next 6 weeks.                          Status: Continued - Date(s):9/6/16      Intervention(s)  Therapist will teach anxiety management strategies.     Objective #C  Client will use cognitive strategies identified in therapy to challenge anxious thoughts.  Status: Continued - Date(s):9/6/16      Intervention(s)  Therapist will teach CBT skills.     Goal 2: Client will reduce depressive symptoms.  Objective #A (Client Action)                Client will identify at least 3 cognitive distortions contributing to depression.  Status: Continued - Date: 9/6/16      Intervention(s)  Therapist will teach CBT skills.     Objective #B  Client will identify at least one positive each day.   Status: Completed - Date: 9/6/16      Intervention(s)  Therapist will assign homework and provide accountability.     Goal 3: Client will improve body image.  Objective #A (Client Action)                Client will increase appreciation for his body and its capabilities.  Status: Completed- Date: 9/6/16      Intervention(s)  Therapist will use ACT, CBT skills to modify unhealthy beliefs about body image.    Client  has reviewed and agreed to the above plan.      Nina Conner, LP  February 27, 2019

## 2019-12-11 ENCOUNTER — OFFICE VISIT (OUTPATIENT)
Dept: PSYCHOLOGY | Facility: CLINIC | Age: 19
End: 2019-12-11
Payer: COMMERCIAL

## 2019-12-11 DIAGNOSIS — F90.2 ATTENTION DEFICIT HYPERACTIVITY DISORDER, COMBINED TYPE: ICD-10-CM

## 2019-12-11 DIAGNOSIS — F32.9 MDD (MAJOR DEPRESSIVE DISORDER): ICD-10-CM

## 2019-12-11 DIAGNOSIS — F41.1 GENERALIZED ANXIETY DISORDER: Primary | ICD-10-CM

## 2019-12-11 PROCEDURE — 90834 PSYTX W PT 45 MINUTES: CPT | Performed by: PSYCHOLOGIST

## 2019-12-11 NOTE — PROGRESS NOTES
Progress Note    Client Name: Bruce Lopez  Date: 12/11/19         Service Type: Individual  Video Visit: No     Session Start Time: 12:15  Session End Time: 13:00     Session Length: 45-50    Session #: 2 (this episode of care)    Attendees: Client attended alone     Treatment Plan Last Reviewed:  9/6/18  PHQ-9 / IGNACIO-7 : due next session  CGI: 4/12/19    DATA  Interactive Complexity: No  Crisis: No       Progress Since Last Session (Related to Symptoms / Goals / Homework):   Symptoms: Improving--periods of increased anxiety, anger    Homework: Variable progress      Episode of Care Goals: Satisfactory progress - ACTION (Actively working towards change); Intervened by reinforcing change plan / affirming steps taken     Current / Ongoing Stressors and Concerns:   New school program, living independently   Death of grandfather and dog ~1 year ago              Parents               Sister with genetic syndrome     Treatment Objective(s) Addressed in This Session:    Assess current needs and symptoms   [Grieve losses   Anxiety management]     Intervention:   Client reported that he was fairly inconsistent with self-care and exercise (goals set in our previous session), but he noted that nonetheless he has been feeling better overall. He described increased motivation and good follow through with academic tasks, personal chores, etc. He is looking forward to starting a new job next week, reports good balance with socializing/productive time. He stated that a significant stressor recently has been the declining mental health and functioning of his roommate. Her symptoms are interfering with all aspects of her life, and their friendship/living arrangement has deteriorated. Client endorsed increased anxiety and some anger associated with these interactions. He has been coping by reaching out to friends, listening to music, and smoking marijuana. Notably, Client  stated that he feels good about how he has behaved and managed himself during this difficult time. He believes that his words and actions are consistent with his values and who he wants to be. Validated this important growth. Client said that his roommate has recently decided that she will move out (well before the agreed-upon lease period), but Client reports feeling primarily relief that his apartment will be peaceful and stable once again. His mother has agreed to cover the rent until Client is able to find a new roommate.         ASSESSMENT: Current Emotional / Mental Status (status of significant symptoms):   Risk status (Self / Other harm or suicidal ideation)   Client denies current fears or concerns for personal safety.   Client denies current or recent suicidal ideation or behaviors.   Client denies current or recent homicidal ideation or behaviors.   Client denies current or recent self injurious behavior or ideation.    Client denies other safety concerns.   Client reports there has been no change in risk factors since his last session.     Client reports there has been a change in protective factors since his last session: unhealthy roommate moving out.   A safety and risk management plan has been developed including: Patient consented to co-developed safety plan.  Safety and risk management plan was completed.  Patient agreed to use safety plan should any safety concerns arise.  A copy was given to the patient..     Appearance:   Casual, wearing nose ring , knit cap   Eye Contact:   Good    Psychomotor Behavior: Normal    Attitude:   Cooperative , forthcoming   Orientation:   All   Speech    Rate / Production: Normal     Volume:  Normal    Mood:    Improving--some periods of increased anxiety and anger    Affect:    Appropriate    Thought Content:  Clear    Thought Form:  Coherent  Logical    Insight:    Fair        Medication Review:   No changes to current psychiatric medication(s); Client taking  Adderall only.     Medication Compliance:   Yes     Changes in Health Issues:   None reported     Chemical Use Review:   Substance Use: No change-- minimal alcohol use reported currently, using marijuana more regularly. Client is not interested in cessation at this time, does not identify his use as problematic. Identified signs that would suggest a problem (using more and more, interference with daily functioning, increasing mental health symptoms, can't afford). He endorses previous experimentation with other substances (not currently).     Tobacco Use: Cigarettes, ecigarette       Diagnosis:   Generalized Anxiety Disorder   ADHD, combined type  Major Depressive Disorder    Collateral Reports Completed:   Not Applicable     PLAN: (Client Tasks / Therapist Tasks / Other)  Client expresses interest in doing PT exercises regularly. He will try pairing a new TV show that he is interested in with completion of his exercises. In challenging interpersonal situations, he will continue to use his own standards and values to guide his behavior, regardless of how anyone else is behaving. He would like to talk further in our next session about the possibility of working with a male therapist to discuss issues related to becoming a mature man and working through the lingering effects of missing his father while growing up. May also consider a therapist in the [place] area to allow for more regular visits. Return appointment scheduled for next month. Will revisit the disposition for future services at that time. Client will call me sooner with any concerning changes in symptoms/functioning or as needed. Will continue to monitor substance use and support efforts to decrease/stop use if Client identifies this as a goal. He continues with medication management through [facility].      Nina Conner, PAL                                                       ______________________________________________________________________    Treatment Plan    Client's Name: Bruce Lopez  YOB: 2000    Date: 9/6/16  DSM-V Diagnoses: Major Depressive Disorder; ADHD, combined type; Generalized Anxiety Disorder  Psychosocial / Contextual Factors:  parents , sister with genetic syndrome  WHODAS:     Referral / Collaboration:  Referral to another professional/service is not indicated at this time.    Anticipated number of session or this episode of care: re-evaluate every 90 days      MeasurableTreatment Goal(s) related to diagnosis / functional impairment(s)  Goal 1: Client will reduce anxiety symptoms.               I will know I've met my goal when I don't feel tense and worried about everyday things.       Objective #A (Client Action)                Client will identify 3 initial signs or symptoms of anxiety.  Status: Completed - Date: 9/6/16      Intervention(s)  Therapist will teach about components of anxiety.     Objective #B  Client will use at least 2 coping skills for anxiety management in the next 6 weeks.                          Status: Continued - Date(s):9/6/16      Intervention(s)  Therapist will teach anxiety management strategies.     Objective #C  Client will use cognitive strategies identified in therapy to challenge anxious thoughts.  Status: Continued - Date(s):9/6/16      Intervention(s)  Therapist will teach CBT skills.     Goal 2: Client will reduce depressive symptoms.  Objective #A (Client Action)                Client will identify at least 3 cognitive distortions contributing to depression.  Status: Continued - Date: 9/6/16      Intervention(s)  Therapist will teach CBT skills.     Objective #B  Client will identify at least one positive each day.   Status: Completed - Date: 9/6/16      Intervention(s)  Therapist will assign homework and provide accountability.     Goal 3: Client will improve body image.  Objective #A  (Client Action)                Client will increase appreciation for his body and its capabilities.  Status: Completed- Date: 9/6/16      Intervention(s)  Therapist will use ACT, CBT skills to modify unhealthy beliefs about body image.    Client has reviewed and agreed to the above plan.      Nina Conner, PAL  February 27, 2019

## 2020-01-17 ENCOUNTER — OFFICE VISIT (OUTPATIENT)
Dept: PSYCHOLOGY | Facility: CLINIC | Age: 20
End: 2020-01-17
Payer: COMMERCIAL

## 2020-01-17 DIAGNOSIS — F32.9 MDD (MAJOR DEPRESSIVE DISORDER): ICD-10-CM

## 2020-01-17 DIAGNOSIS — F41.1 GENERALIZED ANXIETY DISORDER: Primary | ICD-10-CM

## 2020-01-17 DIAGNOSIS — F90.2 ATTENTION DEFICIT HYPERACTIVITY DISORDER, COMBINED TYPE: ICD-10-CM

## 2020-01-17 PROCEDURE — 90834 PSYTX W PT 45 MINUTES: CPT | Performed by: PSYCHOLOGIST

## 2020-01-17 NOTE — PROGRESS NOTES
Progress Note    Client Name: Bruce Lopez  Date: 1/17/20         Service Type: Individual  Video Visit: No     Session Start Time: 10:15  Session End Time: 11:00     Session Length: 45-50    Session #: 3 (this episode of care)    Attendees: Client attended alone     Treatment Plan Last Reviewed:  9/6/18  PHQ-9 / IGNACIO-7 : due next session  CGI: 4/12/19    DATA  Interactive Complexity: No  Crisis: No       Progress Since Last Session (Related to Symptoms / Goals / Homework):   Symptoms: Anxiety, mild depression    Homework: Variable progress      Episode of Care Goals: Satisfactory progress - ACTION (Actively working towards change); Intervened by reinforcing change plan / affirming steps taken     Current / Ongoing Stressors and Concerns:   New school program, living independently   Death of grandfather and dog ~1 year ago              Parents               Sister with genetic syndrome     Treatment Objective(s) Addressed in This Session:    Assess current needs and symptoms   [Grieve losses   Anxiety management]     Intervention:   Client reports he has a full schedule this semester with classes and working. He is also making efforts to manage household chores and self-care tasks more consistently. He is pleased and surprised that he has been able to make it to class by 7a most days. He acknowledges the effort involved in keeping himself on track. He also endorses increased anxiety at bedtime, when he feels overwhelmed about all of the obligations the following day. Talked through a plan for self-soothing at bedtime, including petting his cat, slow breathing, and a calming mantra (all will be well). Normalized Client's experience as common for young adults living independently and taking on greater responsibility. Provided reassurance that he doesn't have to have everything figured out right now. Encouraged him to take time to give himself credit for the  efforts he is making. Client stated that he has identified a male therapist in the [place] area who may be a good fit. He has not yet scheduled an appointment but intends to. He will let me know if he would like other referrals.        ASSESSMENT: Current Emotional / Mental Status (status of significant symptoms):   Risk status (Self / Other harm or suicidal ideation)   Client denies current fears or concerns for personal safety.   Client denies current or recent suicidal ideation or behaviors.   Client denies current or recent homicidal ideation or behaviors.   Client denies current or recent self injurious behavior or ideation.    Client denies other safety concerns.   Client reports there has been no change in risk factors since his last session.     Client reports there has been no change in protective factors since his last session.   A safety and risk management plan has been developed including: Patient consented to co-developed safety plan.  Safety and risk management plan was completed.  Patient agreed to use safety plan should any safety concerns arise.  A copy was given to the patient..     Appearance:   Casual, wearing nose ring , knit cap   Eye Contact:   Good    Psychomotor Behavior: Normal    Attitude:   Engaged    Orientation:   All   Speech    Rate / Production: Normal     Volume:  Normal    Mood:    Anxious (new responsibilities)    Affect:    Appropriate    Thought Content:  Clear    Thought Form:  Coherent  Logical    Insight:    Fair        Medication Review:   No changes to current psychiatric medication(s); Client taking Adderall only.     Medication Compliance:   Yes     Changes in Health Issues:   None reported     Chemical Use Review:   Substance Use: No change-- minimal alcohol use reported currently, using marijuana more regularly. Client is not interested in cessation at this time, does not identify his use as problematic. Identified signs that would suggest a problem (using more and  more, interference with daily functioning, increasing mental health symptoms, can't afford). He endorses previous experimentation with other substances (not currently).     Tobacco Use: Belle       Diagnosis:   Generalized Anxiety Disorder   ADHD, combined type  Major Depressive Disorder    Collateral Reports Completed:   Not Applicable     PLAN: (Client Tasks / Therapist Tasks / Other)  Client will follow plan for soothing at bedtime: petting his cat, slow breathing, and a calming mantra (all will be well). He will remind himself that he is right on track developmentally with the challenges he is having and the work he is doing. He will practice giving himself credit for his efforts. Client has identified a male therapist in the [employer] area that he intends to begin working with. He will let me know if he would like additional referrals. No further appointments here are scheduled. Client is aware that he may call to schedule if desired. He continues with medication management through [facility].      Nina Conner,                                                       ______________________________________________________________________    Treatment Plan    Client's Name: Bruce Lopez  YOB: 2000    Date: 9/6/16  DSM-V Diagnoses: Major Depressive Disorder; ADHD, combined type; Generalized Anxiety Disorder  Psychosocial / Contextual Factors:  parents , sister with genetic syndrome  WHODAS:     Referral / Collaboration:  Referral to another professional/service is not indicated at this time.    Anticipated number of session or this episode of care: re-evaluate every 90 days      MeasurableTreatment Goal(s) related to diagnosis / functional impairment(s)  Goal 1: Client will reduce anxiety symptoms.               I will know I've met my goal when I don't feel tense and worried about everyday things.       Objective #A (Client Action)                Client will identify 3  initial signs or symptoms of anxiety.  Status: Completed - Date: 9/6/16      Intervention(s)  Therapist will teach about components of anxiety.     Objective #B  Client will use at least 2 coping skills for anxiety management in the next 6 weeks.                          Status: Continued - Date(s):9/6/16      Intervention(s)  Therapist will teach anxiety management strategies.     Objective #C  Client will use cognitive strategies identified in therapy to challenge anxious thoughts.  Status: Continued - Date(s):9/6/16      Intervention(s)  Therapist will teach CBT skills.     Goal 2: Client will reduce depressive symptoms.  Objective #A (Client Action)                Client will identify at least 3 cognitive distortions contributing to depression.  Status: Continued - Date: 9/6/16      Intervention(s)  Therapist will teach CBT skills.     Objective #B  Client will identify at least one positive each day.   Status: Completed - Date: 9/6/16      Intervention(s)  Therapist will assign homework and provide accountability.     Goal 3: Client will improve body image.  Objective #A (Client Action)                Client will increase appreciation for his body and its capabilities.  Status: Completed- Date: 9/6/16      Intervention(s)  Therapist will use ACT, CBT skills to modify unhealthy beliefs about body image.    Client has reviewed and agreed to the above plan.      Nina Conner, PAL  February 27, 2019

## 2020-02-20 ENCOUNTER — OFFICE VISIT (OUTPATIENT)
Dept: PSYCHOLOGY | Facility: CLINIC | Age: 20
End: 2020-02-20
Payer: COMMERCIAL

## 2020-02-20 DIAGNOSIS — F90.2 ATTENTION DEFICIT HYPERACTIVITY DISORDER, COMBINED TYPE: ICD-10-CM

## 2020-02-20 DIAGNOSIS — F41.1 GENERALIZED ANXIETY DISORDER: Primary | ICD-10-CM

## 2020-02-20 DIAGNOSIS — F32.9 MAJOR DEPRESSIVE DISORDER: ICD-10-CM

## 2020-02-20 PROCEDURE — 90834 PSYTX W PT 45 MINUTES: CPT | Performed by: PSYCHOLOGIST

## 2020-02-20 ASSESSMENT — PATIENT HEALTH QUESTIONNAIRE - PHQ9
SUM OF ALL RESPONSES TO PHQ QUESTIONS 1-9: 23
5. POOR APPETITE OR OVEREATING: NEARLY EVERY DAY

## 2020-02-20 ASSESSMENT — ANXIETY QUESTIONNAIRES
5. BEING SO RESTLESS THAT IT IS HARD TO SIT STILL: NEARLY EVERY DAY
1. FEELING NERVOUS, ANXIOUS, OR ON EDGE: NEARLY EVERY DAY
7. FEELING AFRAID AS IF SOMETHING AWFUL MIGHT HAPPEN: NEARLY EVERY DAY
2. NOT BEING ABLE TO STOP OR CONTROL WORRYING: NEARLY EVERY DAY
GAD7 TOTAL SCORE: 21
3. WORRYING TOO MUCH ABOUT DIFFERENT THINGS: NEARLY EVERY DAY
6. BECOMING EASILY ANNOYED OR IRRITABLE: NEARLY EVERY DAY

## 2020-02-20 NOTE — PROGRESS NOTES
"                                           Progress Note    Client Name: Bruce Lopez  Date: 2/20/20         Service Type: Individual  Video Visit: No     Session Start Time: 14:00  Session End Time: 14:50     Session Length: 45-50    Session #: 5 (this episode of care)    Attendees: Client attended alone     Treatment Plan Last Reviewed:  2/20/20  PHQ-9 / IGNACIO-7 Last Reviewed : 2/20/20  CGI: 2/20/20    DATA  Interactive Complexity: No  Crisis: No       Progress Since Last Session (Related to Symptoms / Goals / Homework):   Symptoms: Depression, anxiety    Homework: In progress      Episode of Care Goals: Satisfactory progress - ACTION (Actively working towards change); Intervened by reinforcing change plan / affirming steps taken     Current / Ongoing Stressors and Concerns:   New school program, living independently   Death of grandfather and dog ~1 year ago              Parents               Sister with genetic syndrome     Treatment Objective(s) Addressed in This Session:    Assess current needs and symptoms   [Grieve losses   Anxiety management]     Intervention:   Client presents with increased depression and anxiety since our last session. He reported that he was required to rehome his kitten (pets not allowed in his building) about 3 weeks ago, and this has resulted in substantially lower mood and increased loneliness. Around the same time, he decided in conjunction with his psychiatry provider to decrease Adderall dosage (from 30 mg/day to 20), in hopes of improving appetite. He stated that this change has also contributed to decreased functioning, with reduced concentration and follow through noted. He described feeling like \"my head is scrambled eggs.\" He endorsed difficulty attending class, completing household chores, and running errands. He said that he often spends may hours a day in bed or without leaving his apartment. He is still socializing with his girlfriend and will accept " invitations from friends to socialize, but he is not initiating contact on his own. He endorses internal motivation for completing his current class but finds that this has not been sufficient to get him out of bed in the morning. He denies suicidal thoughts, plan, or intent. He does have an appointment next week to review medications again with his psychiatry provider, and he is hopeful that this will help. Discussed the possibility of seeking a higher level of care, such as day treatment, particularly given very elevated scores on PHQ-9 and IGNACIO-7 screenings today. Client declines such referrals at this time, stating that he would still like to make efforts to complete his classwork and believes he can do so. He acknowledged that elevated scores on the screening tools were perhaps intended to emphasize the distress he has been feeling. Spoke at length about the role of avoidance in his current patterns. Client was able to recall previous situations in which avoidance only compounded his struggles. This was helpful insight for him today. Talked about increasing daily structure, in order to reduce paralysis and decision-making demands, and work toward routines (which are often regulating). Client was open to this.         ASSESSMENT: Current Emotional / Mental Status (status of significant symptoms):   Risk status (Self / Other harm or suicidal ideation)   Client denies current fears or concerns for personal safety.   Client denies current or recent suicidal ideation or behaviors.   Client denies current or recent homicidal ideation or behaviors.   Client denies current or recent self injurious behavior or ideation.    Client denies other safety concerns.   Client reports there has been a change in risk factors since his last session: had to rehome his kitten; medication change.     Client reports there has been no change in protective factors since his last session.   A safety and risk management plan has been  developed including: Patient consented to co-developed safety plan.  Safety and risk management plan was completed.  Patient agreed to use safety plan should any safety concerns arise.  A copy was given to the patient.     Appearance:   Casual, wearing nose ring , knit cap   Eye Contact:   Good    Psychomotor Behavior: Normal    Attitude:   Forthcoming    Orientation:   All   Speech    Rate / Production: Normal     Volume:  Normal    Mood:    Depressed, Anxious     Affect:    Appropriate    Thought Content:  Clear    Thought Form:  Coherent  Logical    Insight:    Fair        Medication Review:   Changes to psychiatric medications, see updated Medication List in EPIC.  Now taking 20 mg/day Adderall, with decreased effectiveness noted.     Medication Compliance:   Yes     Changes in Health Issues:   None reported     Chemical Use Review:   Substance Use: No change-- minimal alcohol use reported currently, using marijuana more regularly. Client is not interested in cessation at this time, does not identify his use as problematic. Identified signs that would suggest a problem (using more and more, interference with daily functioning, increasing mental health symptoms, can't afford). He endorses previous experimentation with other substances (not currently).     Tobacco Use: Belle, aaron       Diagnosis:   Generalized Anxiety Disorder   ADHD, combined type  Major Depressive Disorder    Collateral Reports Completed:   PHQ-9= 23   IGNACIO-7= 21   CGI     PLAN: (Client Tasks / Therapist Tasks / Other)  Client will follow plan for increased daily structure, including meal times, shower, chores, and planned relaxation time in order to reduce paralysis and decision-making demands and increase overall regulation. He will challenge himself not to avoid class, but to take action instead by showing up and doing the work (this is what will truly resolve anxiety). He will require himself to go to class even if late and to  stay focused on his own goals while there, rather than worrying about what others may think or say. Client stated that he would like to continue in therapy with me (not transfer to a provider in the [facility]). Return appointments were scheduled. Will continue to monitor symptoms and functioning and revisit the referral for a higher level of care as warranted. He continues with medication management through [facility]; scheduled for medication recheck next week.      Nina Conner, LP                                                      ______________________________________________________________________    Treatment Plan    Client's Name: Bruce Lopez  YOB: 2000    Date: 9/6/16  DSM-V Diagnoses: Major Depressive Disorder; ADHD, combined type; Generalized Anxiety Disorder  Psychosocial / Contextual Factors:  parents , sister with genetic syndrome  WHODAS:     Referral / Collaboration:  Referral to another professional/service is not indicated at this time.    Anticipated number of session or this episode of care: re-evaluate every 90 days      MeasurableTreatment Goal(s) related to diagnosis / functional impairment(s)  Goal 1: Client will reduce anxiety symptoms.               I will know I've met my goal when I don't feel tense and worried about everyday things.       Objective #A (Client Action)                Client will better understand the impact of ADHD symptoms on anxiety.  Status: New - Date: 2/20/20      Intervention(s)  Therapist will provide psychoeducation, teach self-management skills, assign homework.     Objective #B  Client will use at least 2 coping skills for anxiety management in the next 6 weeks.                          Status: Continued - Date(s): 2/20/20      Intervention(s)  Therapist will teach anxiety management strategies.     Objective #C  Client will use cognitive strategies identified in therapy to challenge anxious thoughts.  Status: Continued  "- Date(s):2/20/20      Intervention(s)  Therapist will teach CBT skills.     Goal 2: Client will reduce depressive symptoms.     \"I will know I've met my goal when I can have normal days (get up, take care of responsibilities, without significant emtoional disruption) half the week.\"    Objective #A (Client Action)                Client will identify and label cognitive distortions contributing to depression as they arise.  Status: Continued - Date: 2/20/20      Intervention(s)  Therapist will teach CBT skills.     Objective #B  Client will identify at least one positive each day.   Status: Completed - Date: 9/6/16      Intervention(s)  Therapist will assign homework and provide accountability.    Objective #C  Client will improve motivation for daily chores and obligations.  Status: New - Date(s):2/20/20      Intervention(s)  Therapist will provide psychoeducation, teach behavioral activation strategies, provide support and accountability.     Goal 3: Client will improve body image.  Objective #A (Client Action)                Client will increase appreciation for his body and its capabilities.  Status: Completed- Date: 9/6/16      Intervention(s)  Therapist will use ACT, CBT skills to modify unhealthy beliefs about body image.    Client has reviewed and agreed to the above plan.      Nina Conner, PAL  February 27, 2019  "

## 2020-02-21 ASSESSMENT — ANXIETY QUESTIONNAIRES: GAD7 TOTAL SCORE: 21

## 2020-04-14 ENCOUNTER — TELEPHONE (OUTPATIENT)
Dept: PSYCHOLOGY | Facility: CLINIC | Age: 20
End: 2020-04-14

## 2020-04-17 ENCOUNTER — VIRTUAL VISIT (OUTPATIENT)
Dept: PSYCHOLOGY | Facility: CLINIC | Age: 20
End: 2020-04-17
Payer: COMMERCIAL

## 2020-04-17 DIAGNOSIS — F90.2 ATTENTION DEFICIT HYPERACTIVITY DISORDER, COMBINED TYPE: ICD-10-CM

## 2020-04-17 DIAGNOSIS — F32.9 MAJOR DEPRESSIVE DISORDER: Primary | ICD-10-CM

## 2020-04-17 DIAGNOSIS — F41.1 GENERALIZED ANXIETY DISORDER: ICD-10-CM

## 2020-04-17 PROCEDURE — 90834 PSYTX W PT 45 MINUTES: CPT | Mod: 95 | Performed by: PSYCHOLOGIST

## 2020-04-17 ASSESSMENT — ANXIETY QUESTIONNAIRES
2. NOT BEING ABLE TO STOP OR CONTROL WORRYING: NEARLY EVERY DAY
GAD7 TOTAL SCORE: 17
1. FEELING NERVOUS, ANXIOUS, OR ON EDGE: MORE THAN HALF THE DAYS
3. WORRYING TOO MUCH ABOUT DIFFERENT THINGS: SEVERAL DAYS
7. FEELING AFRAID AS IF SOMETHING AWFUL MIGHT HAPPEN: NEARLY EVERY DAY
6. BECOMING EASILY ANNOYED OR IRRITABLE: MORE THAN HALF THE DAYS
5. BEING SO RESTLESS THAT IT IS HARD TO SIT STILL: NEARLY EVERY DAY

## 2020-04-17 ASSESSMENT — PATIENT HEALTH QUESTIONNAIRE - PHQ9
5. POOR APPETITE OR OVEREATING: NEARLY EVERY DAY
SUM OF ALL RESPONSES TO PHQ QUESTIONS 1-9: 12

## 2020-04-17 NOTE — PROGRESS NOTES
Progress Note    Patient Name: Bruce Lopez  Date: 4/17/20         Service Type: Individual      Session Start Time: 12:00  Session End Time: 12:45     Session Length: 45-50    Session #: 6 (this episode of care)    Attendees: Client attended alone    Telemedicine Visit: The patient's condition can be safely assessed and treated via synchronous audio and visual telemedicine encounter.      Reason for Telemedicine Visit: in-person visits not possible due to COVID-19    Originating Site (Patient Location): Patient's home    Distant Site (Provider Location): [facility]: Anabel Gregg    Consent:  The patient/guardian has verbally consented to: the potential risks and benefits of telemedicine (video visit) versus in person care; bill my insurance or make self-payment for services provided; and responsibility for payment of non-covered services.      Treatment Plan Last Reviewed: 2/20/20  PHQ-9 / IGNACIO-7 Last Reviewed: 4/17/20    DATA  Interactive Complexity: No  Crisis: No       Progress Since Last Session (Related to Symptoms / Goals / Homework):   Symptoms: Improved    Homework: Some progress      Episode of Care Goals: Satisfactory progress - ACTION (Actively working towards change); Intervened by reinforcing change plan / affirming steps taken     Current / Ongoing Stressors and Concerns:   Global COVID-19 outbreak   Academic challenges   Living independently     Treatment Objective(s) Addressed in This Session:   Reduce depressive symptoms  Reduce anxiety symptoms  Improve motivation for daily chores and obligations     Intervention:   This was my first meeting with Client in ~2 months. Reviewed coping with global COVID-19 outbreak and associated social distancing, disruption to usual routines, etc. Client reported that he has actually been feeling much better since our last session after a psychiatry appointment in which medications were adjusted  "(including increase in dosage of stimulant medication and addition of antidepressant). He did take action (rather than staying stuck in avoidance) to address academic concerns prior to the shelter-in-place order, and he was encouraged by the result. Offered reinforcement for his effort. He noted that he is doing OK with online school--trying to be proactive and collaborating with classmates. He feels much more optimistic about the likelihood of graduating and is planning to continue taking college courses over the summer and next fall. He reported that he has been somewhat lonely at times due to social distancing, but he has continued to see his girlfriend and one other friend so is not feeling particulalrly isolated. His cleaning job also continues, which has been helpful for income as well as productive activity. He identified next goals for himself: increase daily structure, \"take better care of myself and my space.\" Discussed specific next steps in working toward these goals.      ASSESSMENT: Current Emotional / Mental Status (status of significant symptoms):   Risk status (Self / Other harm or suicidal ideation)   Patient denies current fears or concerns for personal safety.   Patient denies current or recent suicidal ideation or behaviors.   Patient denies current or recent homicidal ideation or behaviors.   Patient denies current or recent self injurious behavior or ideation.   Patient denies other safety concerns.   Patient reports there has been a change in risk factors since his last session:  COVID-19 outbreak and associated life disruptions.   Patient reports there has been a change in protective factors since his last session:  medication changes.   A safety and risk management plan has been developed including: Patient consented to co-developed safety plan.  Safety and risk management plan was completed.  Patient agreed to use safety plan should any safety concerns arise.  A copy was given to the " "patient.     Appearance:   Casual, long hair, beard    Eye Contact:   Good    Psychomotor Behavior: Normal    Attitude:   Cooperative    Orientation:   All   Speech    Rate / Production: Normal     Volume:  Normal    Mood:    Improved overall--some variability, periods of increased depression and anxiety   Affect:    Appropriate    Thought Content:  Some negative rumination   Thought Form:  Coherent  Logical    Insight:    Good  and Fair      Medication Review:   Changes to psychiatric medications, see updated Medication List in EPIC. Adderall dosage increased to 30 mg/day; Lexapro 5 mg/day added     Medication Compliance:   Yes     Changes in Health Issues:   None reported     Chemical Use Review:   Substance Use: Regular marijuana use; minimal alcohol use. Client is not interested in cessation at this time. Identified signs that would suggest a problem (using more and more, interference with daily functioning, increasing mental health symptoms, can't afford).     Tobacco Use: Ecigarette, cigarettes    Diagnosis:  1. Major depressive disorder    2. Generalized anxiety disorder    3. Attention deficit hyperactivity disorder, combined type        Collateral Reports Completed:   Telemedicine consent    PHQ-9=12   IGNACIO-7=17    PLAN: (Patient Tasks / Therapist Tasks / Other)  Client would like to work toward increasing daily structure and taking \"better care of myself and my space.\" Specifically he will eat at least 2 meals/day (with less concern about what he is eating), and will keep up with washing dishes. He will shift his thinking away from the effort involved and consider how he will feel when the sink is clean. Continue to encourage action over avoidance to address anxiety. Client stated that he is comfortable continuing with video visits until it is safe for us to meet in person once again. Return appointment scheduled. Client is aware that he can contact me sooner if needed.      Nina Conner, LP                " "                                       ______________________________________________________________________    Treatment Plan    Patient's Name: Bruce Lopez  YOB: 2000    Date: 9/6/16  DSM-V Diagnoses: Major Depressive Disorder; ADHD, combined type; Generalized Anxiety Disorder  Psychosocial / Contextual Factors:  parents , sister with genetic syndrome  WHODAS:      Referral / Collaboration:  Referral to another professional/service is not indicated at this time.     Anticipated number of session or this episode of care: re-evaluate every 90 days        MeasurableTreatment Goal(s) related to diagnosis / functional impairment(s)  Goal 1: Client will reduce anxiety symptoms.               I will know I've met my goal when I don't feel tense and worried about everyday things.       Objective #A (Client Action)                Client will better understand the impact of ADHD symptoms on anxiety.  Status: New - Date: 2/20/20      Intervention(s)  Therapist will provide psychoeducation, teach self-management skills, assign homework.     Objective #B  Client will use at least 2 coping skills for anxiety management in the next 6 weeks.                          Status: Continued - Date(s): 2/20/20      Intervention(s)  Therapist will teach anxiety management strategies.     Objective #C  Client will use cognitive strategies identified in therapy to challenge anxious thoughts.  Status: Continued - Date(s):2/20/20      Intervention(s)  Therapist will teach CBT skills.     Goal 2: Client will reduce depressive symptoms.                \"I will know I've met my goal when I can have normal days (get up, take care of responsibilities, without significant emtoional disruption) half the week.\"     Objective #A (Client Action)                Client will identify and label cognitive distortions contributing to depression as they arise.  Status: Continued - Date: 2/20/20      Intervention(s)  Therapist " will teach CBT skills.     Objective #B  Client will identify at least one positive each day.   Status: Completed - Date: 9/6/16      Intervention(s)  Therapist will assign homework and provide accountability.     Objective #C  Client will improve motivation for daily chores and obligations.  Status: New - Date(s):2/20/20      Intervention(s)  Therapist will provide psychoeducation, teach behavioral activation strategies, provide support and accountability.     Goal 3: Client will improve body image.  Objective #A (Client Action)                Client will increase appreciation for his body and its capabilities.  Status: Completed- Date: 9/6/16      Intervention(s)  Therapist will use ACT, CBT skills to modify unhealthy beliefs about body image.       Patient has reviewed and agreed to the above plan.      Nina Conner, PAL  April 17, 2020

## 2020-04-18 ASSESSMENT — ANXIETY QUESTIONNAIRES: GAD7 TOTAL SCORE: 17

## 2020-05-01 ENCOUNTER — VIRTUAL VISIT (OUTPATIENT)
Dept: PSYCHOLOGY | Facility: CLINIC | Age: 20
End: 2020-05-01
Payer: COMMERCIAL

## 2020-05-01 DIAGNOSIS — F41.1 GENERALIZED ANXIETY DISORDER: Primary | ICD-10-CM

## 2020-05-01 DIAGNOSIS — F90.2 ATTENTION DEFICIT HYPERACTIVITY DISORDER, COMBINED TYPE: ICD-10-CM

## 2020-05-01 DIAGNOSIS — F32.9 MAJOR DEPRESSIVE DISORDER: ICD-10-CM

## 2020-05-01 PROCEDURE — 90834 PSYTX W PT 45 MINUTES: CPT | Mod: 95 | Performed by: PSYCHOLOGIST

## 2020-05-01 NOTE — PROGRESS NOTES
Progress Note    Patient Name: Bruce Lopez  Date: 5/1/20         Service Type: Individual      Session Start Time: 10:00  Session End Time: 10:45     Session Length: 45-50    Session #: 7 (this episode of care)    Attendees: Client attended alone    Telemedicine Visit: The patient's condition can be safely assessed and treated via synchronous audio and visual telemedicine encounter.  [employer] platform was used.    Reason for Telemedicine Visit: in-person visits not possible due to COVID-19    Originating Site (Patient Location): Patient's home    Distant Site (Provider Location): Provider Remote Setting; home office    Consent:  The patient/guardian has verbally consented to: the potential risks and benefits of telemedicine (video visit) versus in person care; bill my insurance or make self-payment for services provided; and responsibility for payment of non-covered services.      Treatment Plan Last Reviewed: 2/20/20  PHQ-9 / IGNACIO-7 Last Reviewed: 4/17/20    DATA  Interactive Complexity: No  Crisis: No       Progress Since Last Session (Related to Symptoms / Goals / Homework):   Symptoms: Improved    Homework: Good progress      Episode of Care Goals: Satisfactory progress - ACTION (Actively working towards change); Intervened by reinforcing change plan / affirming steps taken     Current / Ongoing Stressors and Concerns:   Global COVID-19 outbreak   Academic challenges   Living independently   Limited relationship with father     Treatment Objective(s) Addressed in This Session:   Reduce depressive symptoms  Reduce anxiety symptoms  Improve motivation for daily chores and obligations     Intervention:   Client endorsed improved mood and decreased anxiety since our last sesion. He stated that he decided to decrease marijuana use, and this has resulted in increased productivity and goal-directed behavior. He reports cigarette smoking has also decreased, vale  "use remains unchanged, caffeine intake has increased. Discussed how he would like to make use of this information about the impact of marijuana use on behavior. He believes that feeling more productive and making progress toward his goals has contributed to improved mood. He noted that reduced social demands (with shelter-in-place order, online classes) have helped boost mood and lower anxiety as well. A recent conversation in which he was able to express many deep and long-suppressed feelings about his relationship with his dad also seems to have brought some relief. Talked at length about issues related to Client's relationship with his dad, brought on by recent negative interactions when his dad was intoxicated. Discussed the story Client is telling himself about their relationship, what this does/doesn't mean about him, and his options with the relationship moving forward. Client was pleased to share that he will be bringing his cat back to the apartment (made arrangements with his landlord) and plans to  his bike to start riding. 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 He described motivation to \"put my own structure in my life.\" He is monitoring his food intake, striving for improved nutrition.      ASSESSMENT: Current Emotional / Mental Status (status of significant symptoms):   Risk status (Self / Other harm or suicidal ideation)   Patient denies current fears or concerns for personal safety.   Patient denies current or recent suicidal ideation or behaviors.   Patient denies current or recent homicidal ideation or behaviors.   Patient denies current or recent self injurious behavior or ideation.   Patient denies other safety concerns.   Patient reports there has been no change in risk factors since his last session.   Patient reports there has been a change in protective factors since his last session: " reduced substance use; bringing cat back to apartment.   A safety and risk management plan has been developed including: Patient consented to co-developed safety plan.  Safety and risk management plan was completed.  Patient agreed to use safety plan should any safety concerns arise.  A copy was given to the patient.     Appearance:   Casual, long hair, beard    Eye Contact:   Good    Psychomotor Behavior: Normal    Attitude:   Open, forthcoming    Orientation:   All   Speech    Rate / Production: Normal     Volume:  Normal    Mood:    Improved--reduced anxiety and depression   Affect:    Appropriate    Thought Content:  More balanced and adaptive   Thought Form:  Coherent  Logical    Insight:    Fair      Medication Review:   No changes to current psychiatric medication(s); Adderall dosage 30 mg/day; Lexapro 5 mg/day     Medication Compliance   Yes     Changes in Health Issues:   None reported     Chemical Use Review:   Substance Use: Decreased marijuana use; minimal alcohol use. Client is not interested in cessation at this time. Identified signs that would suggest a problem (using more and more, interference with daily functioning, increasing mental health symptoms, can't afford).     Tobacco Use: Ecigarette, cigarettes    Diagnosis:  1. Generalized anxiety disorder    2. Major depressive disorder    3. Attention deficit hyperactivity disorder, combined type        Collateral Reports Completed:   Telemedicine consent       PLAN: (Patient Tasks / Therapist Tasks / Other)  Client has a plan for the summer which includes: working at his evening job, continuing with online school, and exercising regularly. He will keep a food log, striving for improved nutrition. He will continue with reduced substance use. Client will give himself permission to draw boundaries around the relationship with his dad (e.g., decline to  his calls or respond to texts if he wishes). He would like to talk more in future sessions  about the messages he might like to share with his dad. Continue to encourage action over avoidance to address anxiety. Client stated that he is comfortable continuing with video visits until it is safe for us to meet in person once again. Return appointment scheduled. Client is aware that he can contact me sooner if needed.      Nina Conner, LP                                                       ______________________________________________________________________    Treatment Plan    Patient's Name: Bruce Lopez  YOB: 2000    Date: 9/6/16  DSM-V Diagnoses: Major Depressive Disorder; ADHD, combined type; Generalized Anxiety Disorder  Psychosocial / Contextual Factors:  parents , sister with genetic syndrome  WHODAS:      Referral / Collaboration:  Referral to another professional/service is not indicated at this time.     Anticipated number of session or this episode of care: re-evaluate every 90 days        MeasurableTreatment Goal(s) related to diagnosis / functional impairment(s)  Goal 1: Client will reduce anxiety symptoms.               I will know I've met my goal when I don't feel tense and worried about everyday things.       Objective #A (Client Action)                Client will better understand the impact of ADHD symptoms on anxiety.  Status: New - Date: 2/20/20      Intervention(s)  Therapist will provide psychoeducation, teach self-management skills, assign homework.     Objective #B  Client will use at least 2 coping skills for anxiety management in the next 6 weeks.                          Status: Continued - Date(s): 2/20/20      Intervention(s)  Therapist will teach anxiety management strategies.     Objective #C  Client will use cognitive strategies identified in therapy to challenge anxious thoughts.  Status: Continued - Date(s):2/20/20      Intervention(s)  Therapist will teach CBT skills.     Goal 2: Client will reduce depressive symptoms.                 "\"I will know I've met my goal when I can have normal days (get up, take care of responsibilities, without significant emtoional disruption) half the week.\"     Objective #A (Client Action)                Client will identify and label cognitive distortions contributing to depression as they arise.  Status: Continued - Date: 2/20/20      Intervention(s)  Therapist will teach CBT skills.     Objective #B  Client will identify at least one positive each day.   Status: Completed - Date: 9/6/16      Intervention(s)  Therapist will assign homework and provide accountability.     Objective #C  Client will improve motivation for daily chores and obligations.  Status: New - Date(s):2/20/20      Intervention(s)  Therapist will provide psychoeducation, teach behavioral activation strategies, provide support and accountability.     Goal 3: Client will improve body image.  Objective #A (Client Action)                Client will increase appreciation for his body and its capabilities.  Status: Completed- Date: 9/6/16      Intervention(s)  Therapist will use ACT, CBT skills to modify unhealthy beliefs about body image.       Patient has reviewed and agreed to the above plan.      Nina Conner, PAL  April 17, 2020  "

## 2020-05-28 ENCOUNTER — VIRTUAL VISIT (OUTPATIENT)
Dept: PSYCHOLOGY | Facility: CLINIC | Age: 20
End: 2020-05-28
Payer: COMMERCIAL

## 2020-05-28 DIAGNOSIS — F41.1 GENERALIZED ANXIETY DISORDER: Primary | ICD-10-CM

## 2020-05-28 DIAGNOSIS — F90.2 ATTENTION DEFICIT HYPERACTIVITY DISORDER, COMBINED TYPE: ICD-10-CM

## 2020-05-28 DIAGNOSIS — F32.9 MAJOR DEPRESSIVE DISORDER: ICD-10-CM

## 2020-05-28 PROCEDURE — 90834 PSYTX W PT 45 MINUTES: CPT | Mod: 95 | Performed by: PSYCHOLOGIST

## 2020-05-28 NOTE — PROGRESS NOTES
"                                           Progress Note    Patient Name: Bruce Lopez  Date: 5/28/20         Service Type: Individual      Session Start Time: 12:00  Session End Time: 12:50     Session Length: 45-50    Session #: 8 (this episode of care)    Attendees: Client attended alone    Telemedicine Visit: The patient's condition can be safely assessed and treated via synchronous audio and visual telemedicine encounter.  [employer] platform was used.    Reason for Telemedicine Visit: in-person visits not possible due to COVID-19    Originating Site (Patient Location): Patient's home    Distant Site (Provider Location): Provider Remote Setting; home office    Consent:  The patient/guardian has verbally consented to: the potential risks and benefits of telemedicine (video visit) versus in person care; bill my insurance or make self-payment for services provided; and responsibility for payment of non-covered services.      Treatment Plan Last Reviewed: 2/20/20  PHQ-9 / IGNACIO-7 Last Reviewed: 4/17/20    DATA  Interactive Complexity: No  Crisis: No       Progress Since Last Session (Related to Symptoms / Goals / Homework):   Symptoms: Improved    Homework: Good progress      Episode of Care Goals: Satisfactory progress - ACTION (Actively working towards change); Intervened by reinforcing change plan / affirming steps taken     Current / Ongoing Stressors and Concerns:   Global COVID-19 outbreak   Academic challenges   Living independently   Limited relationship with father     Treatment Objective(s) Addressed in This Session:   Reduce depressive symptoms  Reduce anxiety symptoms  Improve motivation for daily chores and obligations     Intervention:   Patient reported several positive developments since our last session. He noted that his efforts to decrease marijuana use were successful and allowed him to feel more clear-headed. He stated that he has been journaling regularly and \"trying to work on myself.\" He " has been focused on some specific self-improvements including: apologize less, stop taking responsibility for other people's problems; increase comfort with deep conversations (without feeling under the microscope); increase physical activity. As a result of this work, he believes he is feeling more comfortable in his own skin, and this has resulted in lower social anxiety as well. He endorsed increased motivation and shared a recent example of a time in which he was tempted to avoid responsibilities but was able to work past that impulse and stay on track with his plan for the day. He stated that he has not smoked cigarettes for 2 weeks, is decreasing nicotine concentration in his vaping liquid. 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 Now he is taking a class (that he previously failed twice) and feeling optimistic about his prospects for finishing. Offered reinforcement for all of these efforts and highlighted the positive feelings that come when actions are aligned with values. We discussed Client's thoughts about trying to see himself as friends or loved ones do. This is a work in progress. He reported that he has had minimal interaction with his dad since our last session. This has not been on his mind much recently, although he acknowledges that there are unfinished aspects that he would like to resolve with his dad at some point.     ASSESSMENT: Current Emotional / Mental Status (status of significant symptoms):   Risk status (Self / Other harm or suicidal ideation)   Patient denies current fears or concerns for personal safety.   Patient denies current or recent suicidal ideation or behaviors.   Patient denies current or recent homicidal ideation or behaviors.   Patient denies current or recent self injurious behavior or ideation.   Patient denies other safety concerns.   Patient reports there has been no change in risk factors since his last session.   Patient reports there has been no  "change in protective factors since his last session.   A safety and risk management plan has been developed including: Patient consented to co-developed safety plan.  Safety and risk management plan was completed.  Patient agreed to use safety plan should any safety concerns arise.  A copy was given to the patient.     Appearance:   Casual, long hair, beard    Eye Contact:   Good    Psychomotor Behavior: Normal    Attitude:   Engaged, thoughtful    Orientation:   All   Speech    Rate / Production: Normal     Volume:  Normal    Mood:    Improved--anxiety and depression lower; increased self-acceptance   Affect:    Appropriate    Thought Content:  Decreased personalization, greater ability to separate from his thoughts   Thought Form:  Coherent  Logical    Insight:    Fair      Medication Review:   No changes to current psychiatric medication(s); Adderall dosage 30 mg/day; Lexapro 5 mg/day     Medication Compliance   Yes     Changes in Health Issues:   None reported     Chemical Use Review:   Substance Use: Decreased marijuana use; minimal alcohol use. Client is not interested in cessation at this time. Identified signs that would suggest a problem (using more and more, interference with daily functioning, increasing mental health symptoms, can't afford). He endorses one instance of ingesting psilocybin mushrooms.     Tobacco Use: Ecigarette--decreasing nicotine content; has not smoked cigarettes for last 2 weeks, intends to continue with abstinence    Diagnosis:  1. Generalized anxiety disorder    2. Major depressive disorder    3. Attention deficit hyperactivity disorder, combined type        Collateral Reports Completed:   Telemedicine consent       PLAN: (Patient Tasks / Therapist Tasks / Other)  Client's immediate goal is to maintain a regular daily structure that supports completion of an online summer class. He will continue with regular journaling and \"staying in my own jie\" to avoid taking on the problems of " others. He is seeking consistency with eating and activity habits. He will continue with reduced substance use. Client will give himself permission to draw boundaries around the relationship with his dad (e.g., decline to  his calls or respond to texts if he wishes). He would like to talk more in future sessions about the messages he might like to share with his dad. Client stated that he is comfortable continuing with video visits until it is safe for us to meet in person once again. Return appointment scheduled. Client is aware that he can contact me sooner if needed.      Nina Conner, LP                                                       ______________________________________________________________________    Treatment Plan    Patient's Name: Bruce Lopez  YOB: 2000    Date: 9/6/16  DSM-V Diagnoses: Major Depressive Disorder; ADHD, combined type; Generalized Anxiety Disorder  Psychosocial / Contextual Factors:  parents , sister with genetic syndrome  WHODAS:      Referral / Collaboration:  Referral to another professional/service is not indicated at this time.     Anticipated number of session or this episode of care: re-evaluate every 90 days        MeasurableTreatment Goal(s) related to diagnosis / functional impairment(s)  Goal 1: Client will reduce anxiety symptoms.               I will know I've met my goal when I don't feel tense and worried about everyday things.       Objective #A (Client Action)                Client will better understand the impact of ADHD symptoms on anxiety.  Status: New - Date: 2/20/20      Intervention(s)  Therapist will provide psychoeducation, teach self-management skills, assign homework.     Objective #B  Client will use at least 2 coping skills for anxiety management in the next 6 weeks.                          Status: Continued - Date(s): 2/20/20      Intervention(s)  Therapist will teach anxiety management  "strategies.     Objective #C  Client will use cognitive strategies identified in therapy to challenge anxious thoughts.  Status: Continued - Date(s):2/20/20      Intervention(s)  Therapist will teach CBT skills.     Goal 2: Client will reduce depressive symptoms.                \"I will know I've met my goal when I can have normal days (get up, take care of responsibilities, without significant emtoional disruption) half the week.\"     Objective #A (Client Action)                Client will identify and label cognitive distortions contributing to depression as they arise.  Status: Continued - Date: 2/20/20      Intervention(s)  Therapist will teach CBT skills.     Objective #B  Client will identify at least one positive each day.   Status: Completed - Date: 9/6/16      Intervention(s)  Therapist will assign homework and provide accountability.     Objective #C  Client will improve motivation for daily chores and obligations.  Status: New - Date(s):2/20/20      Intervention(s)  Therapist will provide psychoeducation, teach behavioral activation strategies, provide support and accountability.     Goal 3: Client will improve body image.  Objective #A (Client Action)                Client will increase appreciation for his body and its capabilities.  Status: Completed- Date: 9/6/16      Intervention(s)  Therapist will use ACT, CBT skills to modify unhealthy beliefs about body image.       Patient has reviewed and agreed to the above plan.      Nina Conner LP  April 17, 2020  "

## 2020-06-30 ENCOUNTER — VIRTUAL VISIT (OUTPATIENT)
Dept: PSYCHOLOGY | Facility: CLINIC | Age: 20
End: 2020-06-30
Payer: COMMERCIAL

## 2020-06-30 DIAGNOSIS — F32.9 MAJOR DEPRESSIVE DISORDER: ICD-10-CM

## 2020-06-30 DIAGNOSIS — F41.1 GENERALIZED ANXIETY DISORDER: Primary | ICD-10-CM

## 2020-06-30 DIAGNOSIS — F90.2 ATTENTION DEFICIT HYPERACTIVITY DISORDER, COMBINED TYPE: ICD-10-CM

## 2020-06-30 PROCEDURE — 90834 PSYTX W PT 45 MINUTES: CPT | Mod: 95 | Performed by: PSYCHOLOGIST

## 2020-06-30 NOTE — PROGRESS NOTES
Progress Note    Patient Name: Bruce Lopez  Date: 6/30/20         Service Type: Individual      Session Start Time: 13:04  Session End Time: 13:46     Session Length: 37-52 mins    Session #: 9 (this episode of care)    Attendees: Client attended alone    Telemedicine Visit: The patient's condition can be safely assessed and treated via synchronous audio and visual telemedicine encounter.  [employer] platform was used.    Reason for Telemedicine Visit: in-person visits not possible due to COVID-19    Originating Site (Patient Location): Patient's home    Distant Site (Provider Location): Provider Remote Setting; home office    Consent:  The patient/guardian has verbally consented to: the potential risks and benefits of telemedicine (video visit) versus in person care; bill my insurance or make self-payment for services provided; and responsibility for payment of non-covered services.      Treatment Plan Last Reviewed: 6/30/20  PHQ-9 / IGNACIO-7 Last Reviewed: 4/17/20    DATA  Interactive Complexity: No  Crisis: No       Progress Since Last Session (Related to Symptoms / Goals / Homework):   Symptoms: Stable    Homework: Variable progress      Episode of Care Goals: Satisfactory progress - ACTION (Actively working towards change); Intervened by reinforcing change plan / affirming steps taken     Current / Ongoing Stressors and Concerns:   Global COVID-19 outbreak   Academic challenges   Living independently   Limited relationship with father     Treatment Objective(s) Addressed in This Session:   Reduce depressive symptoms  Reduce anxiety symptoms  Improve motivation for daily chores and obligations     Intervention:   Client described good maintenance of progress overall--he is keeping up with responsibilities, exercising, socializing, maintaining a reasonable sleep schedule. Substance use remains lower and he has reduced nicotine concentration in his e-cigarette. Yes he  "has been able to keep up with his schedule and managing necessary tasks. He did note that he sometimes feels \"confused\" by this progress and his general trend toward positivity. Further exploration of this feeling indicates that it is unfamiliar and he is wary of trusting his progress (akin to waiting for \"the other shoe to drop\"). Also feels somewhat foreign, \"not like myself.\" Normalized some discomfort that may accompany personal growth, even in a positive direction, simply because it is new territory. Talked about giving himself time to feel more comfortable or to recognize these parts of himself too. Client did say that eating has been somewhat erratic, and he continues to work on this. He noted that he has not eaten anything yet today. Agreed that it would be wise for him to eat something and he had a protein shake during the session. Worked on interpersonal boundaries with his girlfriend and her sister (also a friend of Client).     ASSESSMENT: Current Emotional / Mental Status (status of significant symptoms):   Risk status (Self / Other harm or suicidal ideation)   Patient denies current fears or concerns for personal safety.   Patient denies current or recent suicidal ideation or behaviors.   Patient denies current or recent homicidal ideation or behaviors.   Patient denies current or recent self injurious behavior or ideation.   Patient denies other safety concerns.   Patient reports there has been no change in risk factors since his last session.   Patient reports there has been no change in protective factors since his last session.   A safety and risk management plan has been developed including: Patient consented to co-developed safety plan.  Safety and risk management plan was completed.  Patient agreed to use safety plan should any safety concerns arise.  A copy was given to the patient.     Appearance:   Casual, long hair, beard    Eye Contact:   Good    Psychomotor Behavior: Slightly more active " than is typical    Attitude:   Mildly distracted    Orientation:   All   Speech    Rate / Production: Normal     Volume:  Normal    Mood:    Stable, improved   Affect:    Appropriate    Thought Content:  Working on integrating new aspects of himself (maturity) into his identity    Thought Form:  Coherent  Logical    Insight:    Fair      Medication Review:   No changes to current psychiatric medication(s); Adderall dosage 30 mg/day; Lexapro 5 mg/day     Medication Compliance   Yes     Changes in Health Issues:   None reported     Chemical Use Review:   Substance Use: Decreased marijuana use; minimal alcohol use. Client is not interested in cessation at this time. Identified signs that would suggest a problem (using more and more, interference with daily functioning, increasing mental health symptoms, can't afford). He endorses one instance of ingesting psilocybin mushrooms.     Tobacco Use: Ecigarette--decreasing nicotine concentration (down from 36 to 24); smoking cigarettes occasionally (when socializing)    Diagnosis:  1. Generalized anxiety disorder    2. Major depressive disorder    3. Attention deficit hyperactivity disorder, combined type        Collateral Reports Completed:   Telemedicine consent       PLAN: (Patient Tasks / Therapist Tasks / Other)  Client is working on integrating new aspects of his identity, thinking of himself as more responsible and mature (which feels somewhat foreign currently). He will continue to notice and observe, allowing himself time to develop greater comfort in this new territory. He will work on spreading calories out more evenly across the day (instead of waiting until nighttime to eat). He will assert his own boundaries with his girlfriend and her sister, choosing not to take sides or become involved in their disputes with each other. He will continue with reduced substance use. Client will give himself permission to draw boundaries around the relationship with his dad  (e.g., decline to  his calls or respond to texts if he wishes). He would like to talk more in future sessions about the messages he might like to share with his dad. He requested the contact information for the billing office and I provided him with the phone number for the [facility]. Client stated that he is comfortable continuing with video visits until it is safe for us to meet in person once again. Return appointment scheduled. Client is aware that he can contact me sooner if needed.      Nina Conner, LP                                                       ______________________________________________________________________    Treatment Plan    Patient's Name: Bruce Lopez  YOB: 2000    Date: 9/6/16  DSM-V Diagnoses: Major Depressive Disorder; ADHD, combined type; Generalized Anxiety Disorder  Psychosocial / Contextual Factors:  parents , sister with genetic syndrome  WHODAS:      Referral / Collaboration:  Referral to another professional/service is not indicated at this time.     Anticipated number of session or this episode of care: re-evaluate every 90 days        MeasurableTreatment Goal(s) related to diagnosis / functional impairment(s)  Goal 1: Client will reduce anxiety symptoms.               I will know I've met my goal when I don't feel tense and worried about everyday things.       Objective #A (Client Action)                Client will better understand the impact of ADHD symptoms on anxiety.  Status: Continue - Date: 6/30/20      Intervention(s)  Therapist will provide psychoeducation, teach self-management skills, assign homework.     Objective #B  Client will use at least 2 coping skills for anxiety management in the next 6 weeks.                          Status: Continued - Date(s): 6/30/20      Intervention(s)  Therapist will teach anxiety management strategies.     Objective #C  Client will use cognitive strategies identified in therapy to challenge  "anxious thoughts.  Status: Continued - Date(s):6/30/20      Intervention(s)  Therapist will teach CBT skills.     Goal 2: Client will reduce depressive symptoms.                \"I will know I've met my goal when I can have normal days (get up, take care of responsibilities, without significant emtoional disruption) half the week.\"     Objective #A (Client Action)                Client will identify and label cognitive distortions contributing to depression as they arise.  Status: Continued - Date: 6/30/20      Intervention(s)  Therapist will teach CBT skills.     Objective #B  Client will identify at least one positive each day.   Status: Completed - Date: 9/6/16      Intervention(s)  Therapist will assign homework and provide accountability.     Objective #C  Client will improve motivation for daily chores and obligations.  Status: Continued - Date(s): 6/30/20      Intervention(s)  Therapist will provide psychoeducation, teach behavioral activation strategies, provide support and accountability.     Goal 3: Client will improve body image.  Objective #A (Client Action)                Client will increase appreciation for his body and its capabilities.  Status: Completed- Date: 9/6/16      Intervention(s)  Therapist will use ACT, CBT skills to modify unhealthy beliefs about body image.       Patient has reviewed and agreed to the above plan.      Nina Conner, PAL  April 17, 2020  "

## 2020-10-22 ENCOUNTER — VIRTUAL VISIT (OUTPATIENT)
Dept: PSYCHOLOGY | Facility: CLINIC | Age: 20
End: 2020-10-22
Payer: COMMERCIAL

## 2020-10-22 DIAGNOSIS — F32.9 MAJOR DEPRESSIVE DISORDER: ICD-10-CM

## 2020-10-22 DIAGNOSIS — F41.1 GENERALIZED ANXIETY DISORDER: Primary | ICD-10-CM

## 2020-10-22 DIAGNOSIS — F90.2 ATTENTION DEFICIT HYPERACTIVITY DISORDER, COMBINED TYPE: ICD-10-CM

## 2020-10-22 PROCEDURE — 90834 PSYTX W PT 45 MINUTES: CPT | Mod: 95 | Performed by: PSYCHOLOGIST

## 2020-10-22 NOTE — PROGRESS NOTES
Progress Note    Patient Name: Bruce Lopez  Date: 10/22/20       Service Type: Individual      Session Start Time: 13:01  Session End Time: 13:50     Session Length: 37-52 mins    Session #: 10 (this episode of care)    Attendees: Client attended alone    Telemedicine Visit: The patient's condition can be safely assessed and treated via synchronous audio and visual telemedicine encounter.  [employer] platform was used.    Reason for Telemedicine Visit: in-person visits not possible due to COVID-19    Originating Site (Patient Location): Patient's home    Distant Site (Provider Location): Provider Remote Setting; home office    Consent:  The patient/guardian has verbally consented to: the potential risks and benefits of telemedicine (video visit) versus in person care; bill my insurance or make self-payment for services provided; and responsibility for payment of non-covered services.      Treatment Plan Last Reviewed: 10/22/20  PHQ-9 / IGNACIO-7 Last Reviewed: 4/17/20    DATA  Interactive Complexity: No  Crisis: No       Progress Since Last Session (Related to Symptoms / Goals / Homework):   Symptoms: Some variability    Homework: N/A      Episode of Care Goals: Satisfactory progress - ACTION (Actively working towards change); Intervened by reinforcing change plan / affirming steps taken     Current / Ongoing Stressors and Concerns:   Global COVID-19 outbreak   Academic challenges   Living independently   Limited relationship with father     Treatment Objective(s) Addressed in This Session:   Reduce depressive symptoms  Reduce anxiety symptoms  Improve motivation for daily chores and obligations     Intervention:   This was my first meeting with Client in ~4 months. He reported that he has established care with a therapist in the [place] area to work on intimacy issues, as we previously discussed, and he has been working regularly with her. Discussed what this means for  "the future disposition of our work together. Client stated that he would like to meet infrequently to check in on other non-intimacy issues, but we agreed that a reduced frequency of sessions is appropriate. Client noted that he has discussed this arrangement with his other therapist as well and signed an MARY with her for us to be in contact. He noted that he struggled for several weeks with increased seasonal depression. He contacted his psychiatric provider, and a dose adjustment to his antidepressant was made. He stated this has been helpful. We also talked about use of a light box, given this predictably variation of his symptoms. He has been looking at these online and intends to purchase one. Client asked for input on strategies to help with executive functioning deficits, noting that he is aware of not making good use of his time and often feeling scattered by competing demands. We discussed potential benefits of making a basic schedule for increased structure. Observed that this would also allow him \"guilt-free\" down time, because it will have a place on the schedule (instead of feeling bad about \"unproductive\" time when he knows he has other responsibilities that haven't been met). This resonated with Client. Discussed the specifics around creating a schedule that seems intuitive to him and then modifying as needed based on experience.        ASSESSMENT: Current Emotional / Mental Status (status of significant symptoms):   Risk status (Self / Other harm or suicidal ideation)   Patient denies current fears or concerns for personal safety.   Patient denies current or recent suicidal ideation or behaviors.   Patient denies current or recent homicidal ideation or behaviors.   Patient denies current or recent self injurious behavior or ideation.   Patient denies other safety concerns.   Patient reports there has been no change in risk factors since his last session.   Patient reports there has been no change in " "protective factors since his last session.   A safety and risk management plan has been developed including: Patient consented to co-developed safety plan.  Safety and risk management plan was completed.  Patient agreed to use safety plan should any safety concerns arise.  A copy was given to the patient.     Appearance:   Casual, beard, hair down    Eye Contact:   Good    Psychomotor Behavior: Normal    Attitude:   Cooperative    Orientation:   All   Speech    Rate / Production: Normal     Volume:  Normal    Mood:    Some variability; lower with change in season   Affect:    Appropriate    Thought Content:  Reflective; tendency toward critical thoughts about self   Thought Form:  Coherent  Logical    Insight:    Fair      Medication Review:   Changes to psychiatric medications, see updated Medication List in EPIC. Lexapro increased to 10 mg/day     Medication Compliance   Yes     Changes in Health Issues:   None reported     Chemical Use Review:   Substance Use: Moderate marijuana use; minimal alcohol use. Client is not interested in cessation at this time. Identified signs that would suggest a problem (using more and more, interference with daily functioning, increasing mental health symptoms, can't afford).      Tobacco Use: Regular e-cigarette use; cigarettes intermittently     Diagnosis:  1. Generalized anxiety disorder    2. Major depressive disorder    3. Attention deficit hyperactivity disorder, combined type        Collateral Reports Completed:   Telemedicine consent       PLAN: (Patient Tasks / Therapist Tasks / Other)  Client will order a lightbox and use daily to assist with seasonal depression. He will post a written daily schedule to increase structure. This will include time of homework, household chores, socializing, and also intentional \"Faisal time\" (down time without any specific expectations or obligations). Agreed that we will plan for infrequent check-ins (perhaps every 2 or 3 months), while he " "meets regularly with a different therapist around a focal area of concern. He will schedule a follow-up appointment after the holidays. He is aware that he can contact me sooner if needed.      Nina CASTILLODoe Conner, LP                                                       ______________________________________________________________________    Treatment Plan    Patient's Name: Bruce Lopez  YOB: 2000    Date: 9/6/16  DSM-V Diagnoses: Major Depressive Disorder; ADHD, combined type; Generalized Anxiety Disorder  Psychosocial / Contextual Factors:  parents , sister with genetic syndrome  WHODAS:      Referral / Collaboration:  Referral to another professional/service is not indicated at this time.     Anticipated number of session or this episode of care: re-evaluate every 90 days        MeasurableTreatment Goal(s) related to diagnosis / functional impairment(s)  Goal 1: Client will reduce anxiety symptoms.               I will know I've met my goal when I don't feel tense and worried about everyday things.       Objective #A (Client Action)                Client will better understand the impact of ADHD symptoms on anxiety.  Status: Complete - Date: 10/22/20      Intervention(s)  Therapist will provide psychoeducation, teach self-management skills, assign homework.     Objective #B  Client will use at least 2 coping skills for anxiety management in the next 6 weeks.                          Status: Continued - Date(s): 10/22/20      Intervention(s)  Therapist will teach anxiety management strategies.     Objective #C  Client will use cognitive strategies identified in therapy to challenge anxious thoughts.  Status: Continued - Date(s): 10/22/20      Intervention(s)  Therapist will teach CBT skills.     Goal 2: Client will reduce depressive symptoms.                \"I will know I've met my goal when I can have normal days (get up, take care of responsibilities, without significant emtoional " "disruption) half the week.\"     Objective #A (Client Action)                Client will identify and label cognitive distortions contributing to depression as they arise.  Status: Continued - Date: 10/22/20      Intervention(s)  Therapist will teach CBT skills.     Objective #B  Client will identify at least one positive each day.   Status: Completed - Date: 9/6/16      Intervention(s)  Therapist will assign homework and provide accountability.     Objective #C  Client will improve motivation for daily chores and obligations.  Status: Continued - Date(s): 10/22/20      Intervention(s)  Therapist will provide psychoeducation, teach behavioral activation strategies, provide support and accountability.     Goal 3: Client will improve body image.  Objective #A (Client Action)                Client will increase appreciation for his body and its capabilities.  Status: Completed- Date: 9/6/16      Intervention(s)  Therapist will use ACT, CBT skills to modify unhealthy beliefs about body image.       Patient has reviewed and agreed to the above plan.      Nina Conner, PAL  April 17, 2020  "

## 2020-11-16 ENCOUNTER — HEALTH MAINTENANCE LETTER (OUTPATIENT)
Age: 20
End: 2020-11-16

## 2021-04-16 ENCOUNTER — TRANSFERRED RECORDS (OUTPATIENT)
Dept: HEALTH INFORMATION MANAGEMENT | Facility: CLINIC | Age: 21
End: 2021-04-16

## 2021-05-04 NOTE — PROGRESS NOTES
Progress Note    Client Name: Bruce Lopez  Date: 3/29/19         Service Type: Individual  Video Visit: No     Session Start Time: 11:30  Session End Time: 12:20     Session Length: 45-50    Session #: 7 (this episode of care)    Attendees: Client attended alone     Treatment Plan Last Reviewed:  9/6/18  PHQ-9 / IGNACIO-7 : due next session    DATA  Interactive Complexity: No  Crisis: No       Progress Since Last Session (Related to Symptoms / Goals / Homework):   Symptoms: Stable    Homework: Some progress      Episode of Care Goals: Satisfactory progress - ACTION (Actively working towards change); Intervened by reinforcing change plan / affirming steps taken     Current / Ongoing Stressors and Concerns:                     Death of grandfather and dog in recent months              Breakup of romantic relationship              Completed 1st semester of college, not returning next semester              Parents               Sister with genetic syndrome     Treatment Objective(s) Addressed in This Session:   Grieve losses   Anxiety management     Intervention:   Client was pleased to report that he has a full-time job starting in ~2 weeks. He has also made the decision that he will go to trade school next Fall. He is aware that the increased daily structure of having a job will be beneficial to him in many ways, and he is looking forward to getting started. He described circumscribed periods of strong emotions (either sadness or anxiety; anger has been minimal) but noted that he has been able to get through these without significant distress. Self-evaluation remains relatively positive, although body image has been more negative recently due to not exercising. No thoughts of suicide or self-harm reported. Discussed substance use, which Client continues to report is not problematic. He stated that use has decreased in recent weeks and he believes it will be  Body Location Override (Optional - Billing Will Still Be Based On Selected Body Map Location If Applicable): left outer temple Detail Level: Detailed reduced even more as he starts working full-time. Client shared some new thoughts that have come up regarding his former romantic relationship and the recent break-up. Provided space for Client to continue processing this loss.          ASSESSMENT: Current Emotional / Mental Status (status of significant symptoms):   Risk status (Self / Other harm or suicidal ideation)   Client denies current fears or concerns for personal safety.   Client denies current or recent suicidal ideation or behaviors.   Client denies current or recent homicidal ideation or behaviors.   Client denies current or recent self injurious behavior or ideation. He endorsed several instances of punching things when angry while at school; usually he directed this toward a punching bag, once he punched (and broke) a mirror. He noted that anger seems to have dissipated since he made the decision in late Oct. not to return to school next semester. He denied any impulse to punch or otherwise hurt another person.   Client denies other safety concerns.   Client Client reports there has been no change in risk factors since their last session.     Client Client reports there has been no change in protective factors since their last session.     A safety and risk management plan has been developed including: completed formerly [facility] safety plan; verified that Client has contact numbers for formerly [facility] after-hours service and [facility].     Appearance:   Casual; new nose ring    Eye Contact:   Good    Psychomotor Behavior: Normal    Attitude:   Cooperative    Orientation:   All   Speech    Rate / Production: Normal     Volume:  Normal    Mood:    Stable overall; brief periods of anxiety, sadness; reduced anger    Affect:    Appropriate    Thought Content:  Clear    Thought Form:  Coherent  Logical    Insight:    Fair        Medication Review:   No changes to current psychiatric medication(s)     Medication Compliance:   No--Client stated that he has not taken antidepressant  Depth Of Biopsy: dermis medication for 2+ months. He noted that he initially forgot for several days (staying overnight at friends' houses, etc.) but has since made the decision not to restart. He has continued with Adderall.     Changes in Health Issues:   None reported     Chemical Use Review:   Substance Use: No change-- minimal alcohol use reported currently, using marijuana more regularly. Client is not interested in cessation at this time, does not identify his use as problematic. Identified signs that would suggest a problem (using more and more, interference with daily functioning, increasing mental health symptoms, can't afford). He endorses previous experimentation with other substances (not currently).     Tobacco Use: Occasional cigarettes; uses ecigarette regularly.      Diagnosis:  Major Depressive Disorder  ADHD, combined type  Generalized Anxiety Disorder  Bereavement    Collateral Reports Completed:   Not Applicable     PLAN: (Client Tasks / Therapist Tasks / Other)  As Client prepares to begin full-time work, he is focused on resetting his sleep schedule. He states his intention to return to regular exercise. Support Client as he continues grieving significant losses, including the breakup of a romantic relationship. Goal is to experience the feelings as they arise, cope with them, and then let them pass. He will continue to reach out to family and friends for support as needed. Will continue to monitor substance use and support efforts to decrease/stop use if Client identifies this as a goal. He continues with medication management through [facility]. Return appointments are scheduled. Plan to update treatment plan next session.         Nina Conner LP                                                         ______________________________________________________________________    Treatment Plan    Client's Name: Bruce Lopez  YOB: 2000    Date: 9/6/16  DSM-V Diagnoses: Major Depressive  Disorder; ADHD, combined type; Generalized Anxiety Disorder  Psychosocial / Contextual Factors:  parents , sister with genetic syndrome  WHODAS:     Referral / Collaboration:  Referral to another professional/service is not indicated at this time.    Anticipated number of session or this episode of care: re-evaluate every 90 days      MeasurableTreatment Goal(s) related to diagnosis / functional impairment(s)  Goal 1: Client will reduce anxiety symptoms.               I will know I've met my goal when I don't feel tense and worried about everyday things.       Objective #A (Client Action)                Client will identify 3 initial signs or symptoms of anxiety.  Status: Completed - Date: 9/6/16      Intervention(s)  Therapist will teach about components of anxiety.     Objective #B  Client will use at least 2 coping skills for anxiety management in the next 6 weeks.                          Status: Continued - Date(s):9/6/16      Intervention(s)  Therapist will teach anxiety management strategies.     Objective #C  Client will use cognitive strategies identified in therapy to challenge anxious thoughts.  Status: Continued - Date(s):9/6/16      Intervention(s)  Therapist will teach CBT skills.     Goal 2: Client will reduce depressive symptoms.  Objective #A (Client Action)                Client will identify at least 3 cognitive distortions contributing to depression.  Status: Continued - Date: 9/6/16      Intervention(s)  Therapist will teach CBT skills.     Objective #B  Client will identify at least one positive each day.   Status: Completed - Date: 9/6/16      Intervention(s)  Therapist will assign homework and provide accountability.     Goal 3: Client will improve body image.  Objective #A (Client Action)                Client will increase appreciation for his body and its capabilities.  Status: Completed- Date: 9/6/16      Intervention(s)  Therapist will use ACT, CBT skills to modify unhealthy  Was A Bandage Applied: Yes Size Of Lesion In Cm: 0.8 beliefs about body image.    Client has reviewed and agreed to the above plan.      Nina Conner, LP  February 27, 2019   X Size Of Lesion In Cm: 0 Biopsy Type: H and E Biopsy Method: 15 blade Anesthesia Type: 1% lidocaine with epinephrine Anesthesia Volume In Cc: 1 Hemostasis: Electrocautery Wound Care: Vaseline Dressing: bandage Destruction After The Procedure: No Type Of Destruction Used: Curettage Curettage Text: The wound bed was treated with curettage after the biopsy was performed. Cryotherapy Text: The wound bed was treated with cryotherapy after the biopsy was performed. Electrodesiccation Text: The wound bed was treated with electrodesiccation after the biopsy was performed. No Electrodesiccation And Curettage Text: The wound bed was treated with electrodesiccation and curettage after the biopsy was performed. Silver Nitrate Text: The wound bed was treated with silver nitrate after the biopsy was performed. Lab: [id number] Lab Facility: [id number] Consent: Written consent was obtained and risks were reviewed including but not limited to scarring, infection, bleeding, scabbing, incomplete removal, nerve damage and allergy to anesthesia. Post-Care Instructions: I reviewed with the patient in detail post-care instructions. Patient is to keep the biopsy site dry overnight, and then apply bacitracin twice daily until healed. Patient may apply hydrogen peroxide soaks to remove any crusting. Notification Instructions: Patient will be notified of biopsy results. However, patient instructed to call the office if not contacted within 2 weeks. Billing Type: Third-Party Bill Information: Selecting Yes will display possible errors in your note based on the variables you have selected. This validation is only offered as a suggestion for you. PLEASE NOTE THAT THE VALIDATION TEXT WILL BE REMOVED WHEN YOU FINALIZE YOUR NOTE. IF YOU WANT TO FAX A PRELIMINARY NOTE YOU WILL NEED TO TOGGLE THIS TO 'NO' IF YOU DO NOT WANT IT IN YOUR FAXED NOTE.

## 2021-06-12 NOTE — MR AVS SNAPSHOT
MRN:[id number]                      After Visit Summary   11/3/2017    Bruce Lopez    MRN: [id number]           Visit Information        Provider Department      11/3/2017 8:30 AM Nina Conner LP [facility] Generic      Your next 10 appointments already scheduled     Nov 13, 2017  9:30 AM CST   Return Visit with Nina Conner LP   [facility]    [address] [place] [address]   [phone]            Dec 12, 2017  8:30 AM CST   Return Visit with Nina Conner LP   [facility]    [address] [place] [address]   [phone]            Jan 03, 2018  1:00 PM CST   Return Visit with Nina Conner LP   [facility] [place] ([facility])    [address] [place] [address]   [phone]            Jan 17, 2018  8:30 AM CST   Return Visit with Nina Conner LP   [facility]    [address] [place] [address]   [phone]            Feb 01, 2018  8:30 AM CST   Return Visit with Nina Conner LP   [facility]    [address] [place] [address]   [phone]              [employer] Information     [employer] lets you send messages to your doctor, view your test results, renew your prescriptions, schedule appointments and more. To sign up, go to [email or web address], contact your [facility] clinic or call [phone] during business hours.            Care EveryWhere ID     This is your Care EveryWhere ID. This could be used by other organizations to access your [facility] medical records  Opted out of Care Everywhere exchange        Equal Access to Services     JASS FRAZIER AH: Geovanny Agrawal, berhane hammond, brianna colón  brandon elaine ah. So [facility] [phone].    ATENCIÓN: Si habla español, tiene a castorena disposición servicios gratuitos de asistencia lingüística. Llame al [phone].    We comply with applicable federal civil rights laws and [place] laws. We do not discriminate on the basis of race, color, national origin, age, disability, sex, sexual orientation, or gender identity.             independent

## 2021-09-18 ENCOUNTER — HEALTH MAINTENANCE LETTER (OUTPATIENT)
Age: 21
End: 2021-09-18

## 2021-11-01 ENCOUNTER — TRANSFERRED RECORDS (OUTPATIENT)
Dept: HEALTH INFORMATION MANAGEMENT | Facility: CLINIC | Age: 21
End: 2021-11-01
Payer: COMMERCIAL

## 2021-11-18 ENCOUNTER — OFFICE VISIT (OUTPATIENT)
Dept: SURGERY | Facility: CLINIC | Age: 21
End: 2021-11-18
Payer: COMMERCIAL

## 2021-11-18 VITALS — DIASTOLIC BLOOD PRESSURE: 80 MMHG | SYSTOLIC BLOOD PRESSURE: 120 MMHG | HEART RATE: 102 BPM

## 2021-11-18 DIAGNOSIS — L73.2 HIDRADENITIS SUPPURATIVA: Primary | ICD-10-CM

## 2021-11-18 PROCEDURE — 99242 OFF/OP CONSLTJ NEW/EST SF 20: CPT | Performed by: SURGERY

## 2021-11-18 RX ORDER — ESCITALOPRAM OXALATE 20 MG/1
20 TABLET ORAL DAILY
COMMUNITY

## 2021-11-18 RX ORDER — OMEPRAZOLE 40 MG/1
40 CAPSULE, DELAYED RELEASE ORAL DAILY
COMMUNITY

## 2021-11-18 RX ORDER — DEXTROAMPHETAMINE SACCHARATE, AMPHETAMINE ASPARTATE MONOHYDRATE, DEXTROAMPHETAMINE SULFATE AND AMPHETAMINE SULFATE 7.5; 7.5; 7.5; 7.5 MG/1; MG/1; MG/1; MG/1
30 CAPSULE, EXTENDED RELEASE ORAL DAILY
COMMUNITY

## 2021-11-18 NOTE — LETTER
November 22, 2021          Lucy Gustafson MD  [facility]  [address] [facility] [address]  [place] [address]      RE:   Bruce Lopez 2000      Dear Colleague,    Thank you for referring your patient, Bruce Lopez, to [facility] [place] [facility]. Please see a copy of my visit note below.     This patient is a very pleasant 20-year-old gentleman referred by the above provider for consultation regarding hidradenitis suppurativa.  He reports that he has suffered with this condition for over a decade.  He has indicated a past history of bilateral axillary involvement, bilateral groin, and bilateral buttock involvement.  Most recently he had an area on his left buttock that opened and drained.  That said he does not report any significant areas of chronic draining sinuses.  He has tried multiple methods of therapy for this that of been unsuccessful.     PMH:   has no past medical history on file.  PSH:    has no past surgical history on file.  Social History:   reports that he has been smoking. He has never used smokeless tobacco. He reports that he does not use drugs.  Family History:   family history is not on file.  Medications/Allergies: Home medications and allergies reviewed.     ROS:  The 10 point Review of Systems is negative other than noted in the HPI.     Physical Exam:  /80   Pulse 102   GENERAL: Generally appears well.  Psych: Alert and Oriented.  Normal affect  Eyes: Sclera clear  Respiratory:  Lungs clear to ausculation bilaterally with good air excursion  Cardiovascular:  Regular Rate and Rhythm with no murmurs gallops or rubs, normal peripheral pulses  GI: Abdomen Non Distended Soft Non-Tender  .  Lymphatic/Hematologic/Immune:  No femoral or cervical lymphadenopathy.  Integumentary:  No rashes, he has chronic scarring in both axilla relative to prior episodes of hidradenitis.  There is no active inflammation or infection.  He has a small nodule in the  presacral space to the left of midline that does not appear to be consistent with a pilonidal cyst but more likely related to the hidradenitis.  He has very large areas involving both lateral buttock and kind of the infra gluteal crease of scarring related to prior hidradenitis.  He also has significant scarring as well as some mild irritation but not overtly active disease in both groins as well.  Overall the areas involved are quite large and diffuse.  That said there are no active areas of infection or inflammation at this time.  Neurological: grossly intact        All new lab and imaging data was reviewed.      Impression and Plan:  Patient is a 20 year old male with diffuse hidradenitis suppurativa as described above without significant active disease at present.  Also does not present with significant areas of chronic draining sinuses.     PLAN: Given the severity and diffuseness of disease particularly in the buttock and groin areas I think that this is something that would be better served by evaluation with a plastics and reconstructive surgeon.  This is simply not a situation of straightforward excision and closure.  This was discussed and described to the patient.  His questions were answered.  I gave him the information a couple of different plastic surgeons but suggested that he may have to call around to find someone that would feel comfortable evaluating him.       Again, thank you for allowing me to participate in the care of your patient.      Sincerely,      Raf Michele MD

## 2021-11-21 NOTE — PROGRESS NOTES
[facility]  Surgery Consultation    PCP:  Anoop Smith [phone]  Consultation requested by: Lucy Gustafson MD    HPI: This patient is a very pleasant 20-year-old gentleman referred by the above provider for consultation regarding hidradenitis suppurativa.  He reports that he has suffered with this condition for over a decade.  He has indicated a past history of bilateral axillary involvement, bilateral groin, and bilateral buttock involvement.  Most recently he had an area on his left buttock that opened and drained.  That said he does not report any significant areas of chronic draining sinuses.  He has tried multiple methods of therapy for this that of been unsuccessful.    PMH:   has no past medical history on file.  PSH:    has no past surgical history on file.  Social History:   reports that he has been smoking. He has never used smokeless tobacco. He reports that he does not use drugs.  Family History:   family history is not on file.  Medications/Allergies: Home medications and allergies reviewed.    ROS:  The 10 point Review of Systems is negative other than noted in the HPI.    Physical Exam:  /80   Pulse 102   GENERAL: Generally appears well.  Psych: Alert and Oriented.  Normal affect  Eyes: Sclera clear  Respiratory:  Lungs clear to ausculation bilaterally with good air excursion  Cardiovascular:  Regular Rate and Rhythm with no murmurs gallops or rubs, normal peripheral pulses  GI: Abdomen Non Distended Soft Non-Tender  .  Lymphatic/Hematologic/Immune:  No femoral or cervical lymphadenopathy.  Integumentary:  No rashes, he has chronic scarring in both axilla relative to prior episodes of hidradenitis.  There is no active inflammation or infection.  He has a small nodule in the presacral space to the left of midline that does not appear to be consistent with a pilonidal cyst but more likely related to the hidradenitis.  He has very large areas involving both lateral  buttock and kind of the infra gluteal crease of scarring related to prior hidradenitis.  He also has significant scarring as well as some mild irritation but not overtly active disease in both groins as well.  Overall the areas involved are quite large and diffuse.  That said there are no active areas of infection or inflammation at this time.  Neurological: grossly intact      All new lab and imaging data was reviewed.     Impression and Plan:  Patient is a 20 year old male with diffuse hidradenitis suppurativa as described above without significant active disease at present.  Also does not present with significant areas of chronic draining sinuses.    PLAN: Given the severity and diffuseness of disease particularly in the buttock and groin areas I think that this is something that would be better served by evaluation with a plastics and reconstructive surgeon.  This is simply not a situation of straightforward excision and closure.  This was discussed and described to the patient.  His questions were answered.  I gave him the information a couple of different plastic surgeons but suggested that he may have to call around to find someone that would feel comfortable evaluating him.        Thank you very much for this consult.    Raf Michele M.D.  [facility]  [phone]    Please route or send letter to:  Primary Care Provider (PCP) and Referring Provider

## 2022-01-08 ENCOUNTER — HEALTH MAINTENANCE LETTER (OUTPATIENT)
Age: 22
End: 2022-01-08

## 2022-04-29 PROCEDURE — 88305 TISSUE EXAM BY PATHOLOGIST: CPT | Mod: TC,ORL | Performed by: PLASTIC SURGERY

## 2022-05-02 ENCOUNTER — LAB REQUISITION (OUTPATIENT)
Dept: LAB | Facility: CLINIC | Age: 22
End: 2022-05-02
Payer: COMMERCIAL

## 2022-05-03 LAB
PATH REPORT.COMMENTS IMP SPEC: NORMAL
PATH REPORT.COMMENTS IMP SPEC: NORMAL
PATH REPORT.FINAL DX SPEC: NORMAL
PATH REPORT.GROSS SPEC: NORMAL
PATH REPORT.MICROSCOPIC SPEC OTHER STN: NORMAL
PATH REPORT.RELEVANT HX SPEC: NORMAL
PHOTO IMAGE: NORMAL

## 2022-05-03 PROCEDURE — 88304 TISSUE EXAM BY PATHOLOGIST: CPT | Mod: 26 | Performed by: PATHOLOGY

## 2022-05-03 PROCEDURE — 88305 TISSUE EXAM BY PATHOLOGIST: CPT | Mod: 26 | Performed by: PATHOLOGY

## 2022-11-19 ENCOUNTER — HEALTH MAINTENANCE LETTER (OUTPATIENT)
Age: 22
End: 2022-11-19

## 2023-04-09 ENCOUNTER — HEALTH MAINTENANCE LETTER (OUTPATIENT)
Age: 23
End: 2023-04-09

## 2024-06-16 ENCOUNTER — HEALTH MAINTENANCE LETTER (OUTPATIENT)
Age: 24
End: 2024-06-16

## 2025-06-22 NOTE — MR AVS SNAPSHOT
MRN:[id number]                      After Visit Summary   5/3/2017    Bruce Lopez    MRN: [id number]           Visit Information        Provider Department      5/3/2017 10:30 AM Nina Conner LP [facility]      Your next 10 appointments already scheduled     May 19, 2017  2:00 PM CDT   Return Visit with Nina Conner LP   [facility])    [address] [place] [id number] [phone]            Jun 13, 2017  2:00 PM CDT   Return Visit with Nina Conner LP   [facility] ([facility])    [address] [place] [address] [phone]              MyChart Information     [employer] lets you send messages to your doctor, view your test results, renew your prescriptions, schedule appointments and more. To sign up, go to [email or web address], contact your [facility] clinic or call [phone] during business hours.            Care EveryWhere ID     This is your Care EveryWhere ID. This could be used by other organizations to access your [facility] medical records  [id number]         Statement Selected